# Patient Record
Sex: FEMALE | Race: WHITE | Employment: UNEMPLOYED | URBAN - METROPOLITAN AREA
[De-identification: names, ages, dates, MRNs, and addresses within clinical notes are randomized per-mention and may not be internally consistent; named-entity substitution may affect disease eponyms.]

---

## 2017-04-18 ENCOUNTER — OFFICE VISIT (OUTPATIENT)
Dept: INTERNAL MEDICINE CLINIC | Age: 37
End: 2017-04-18

## 2017-04-18 VITALS
TEMPERATURE: 98.3 F | OXYGEN SATURATION: 98 % | HEART RATE: 100 BPM | WEIGHT: 134.4 LBS | RESPIRATION RATE: 18 BRPM | DIASTOLIC BLOOD PRESSURE: 87 MMHG | HEIGHT: 67 IN | SYSTOLIC BLOOD PRESSURE: 139 MMHG | BODY MASS INDEX: 21.09 KG/M2

## 2017-04-18 DIAGNOSIS — Z00.00 WELL ADULT EXAM: Primary | ICD-10-CM

## 2017-04-18 DIAGNOSIS — R73.02 GLUCOSE INTOLERANCE (IMPAIRED GLUCOSE TOLERANCE): ICD-10-CM

## 2017-04-18 DIAGNOSIS — E55.9 VITAMIN D DEFICIENCY: ICD-10-CM

## 2017-04-18 DIAGNOSIS — D50.8 IRON DEFICIENCY ANEMIA SECONDARY TO INADEQUATE DIETARY IRON INTAKE: ICD-10-CM

## 2017-04-18 NOTE — MR AVS SNAPSHOT
Visit Information Date & Time Provider Department Dept. Phone Encounter #  
 4/18/2017  1:00 PM Christoph Morales MD [facility] [address] [id number] Upcoming Health Maintenance Date Due DTaP/Tdap/Td series (1 - Tdap) 12/29/2003 PAP AKA CERVICAL CYTOLOGY 12/29/2003 Allergies as of 4/18/2017  Review Complete On: 4/18/2017 By: Christoph Morales MD  
  
 Severity Noted Reaction Type Reactions Other Medication  09/19/2013   Intolerance Nausea and Vomiting Patient states that she is intolerant of general anesthesia and requires that an anti-nausea medication be given first.   
  
Current Immunizations  Never Reviewed Name Date Rho(D) Immune Globulin - IM 9/17/2015  1:03 PM, 9/23/2013  9:21 AM  
  
 Not reviewed this visit You Were Diagnosed With   
  
 Codes Comments Well adult exam    -  Primary ICD-10-CM: Z00.00 ICD-9-CM: V70.0 Iron deficiency anemia secondary to inadequate dietary iron intake     ICD-10-CM: D50.8 ICD-9-CM: 280.1 Glucose intolerance (impaired glucose tolerance)     ICD-10-CM: R73.02 
ICD-9-CM: 790.22 Vitamin D deficiency     ICD-10-CM: E55.9 ICD-9-CM: 268.9 Vitals BP Pulse Temp Resp Height(growth percentile) Weight(growth percentile) 139/87 (BP 1 Location: Left arm, BP Patient Position: Sitting) 100 98.3 °F (36.8 °C) (Oral) 18 5' 7\" (1.702 m) 134 lb 6.4 oz (61 kg) LMP SpO2 BMI OB Status Smoking Status 04/09/2017 (Exact Date) 98% 21.05 kg/m2 Having regular periods Never Smoker Vitals History BMI and BSA Data Body Mass Index Body Surface Area 21.05 kg/m 2 1.7 m 2 Preferred Pharmacy Pharmacy Name Phone [id number], [address] [place] [address] [phone] Your Updated Medication List  
  
   
This list is accurate as of: 4/18/17  2:10 PM.  Always use your most recent med list.  
  
  
  
  
 ibuprofen 600 mg tablet Commonly known as:  MOTRIN Take 1 Tab by mouth every six (6) hours as needed for Pain. IRON (FERROUS SULFATE) PO Take  by mouth daily. PRENATAL VITAMIN PO Take 1 Tab by mouth nightly. PROBIOTIC 4X 10-15 mg Tbec Generic drug:  B.infantis-B.ani-B.long-B.bifi Take  by mouth. We Performed the Following FERRITIN [04954 CPT(R)] HEMOGLOBIN A1C WITH EAG [79245 CPT(R)] SED RATE (ESR) [id number] CPT(R)] VITAMIN D, 25 HYDROXY [id number] CPT(R)] Patient Instructions Well Visit, Ages 25 to 48: Care Instructions Your Care Instructions Physical exams can help you stay healthy. Your doctor has checked your overall health and may have suggested ways to take good care of yourself. He or she also may have recommended tests. At home, you can help prevent illness with healthy eating, regular exercise, and other steps. Follow-up care is a key part of your treatment and safety. Be sure to make and go to all appointments, and call your doctor if you are having problems. It's also a good idea to know your test results and keep a list of the medicines you take. How can you care for yourself at home? · Reach and stay at a healthy weight. This will lower your risk for many problems, such as obesity, diabetes, heart disease, and high blood pressure. · Get at least 30 minutes of physical activity on most days of the week. Walking is a good choice. You also may want to do other activities, such as running, swimming, cycling, or playing tennis or team sports. Discuss any changes in your exercise program with your doctor. · Do not smoke or allow others to smoke around you. If you need help quitting, talk to your doctor about stop-smoking programs and medicines. These can increase your chances of quitting for good.  
· Talk to your doctor about whether you have any risk factors for sexually transmitted infections (STIs). Having one sex partner (who does not have STIs and does not have sex with anyone else) is a good way to avoid these infections. · Use birth control if you do not want to have children at this time. Talk with your doctor about the choices available and what might be best for you. · Protect your skin from too much sun. When you're outdoors from 10 a.m. to 4 p.m., stay in the shade or cover up with clothing and a hat with a wide brim. Wear sunglasses that block UV rays. Even when it's cloudy, put broad-spectrum sunscreen (SPF 30 or higher) on any exposed skin. · See a dentist one or two times a year for checkups and to have your teeth cleaned. · Wear a seat belt in the car. · Drink alcohol in moderation, if at all. That means no more than 2 drinks a day for men and 1 drink a day for women. Follow your doctor's advice about when to have certain tests. These tests can spot problems early. For everyone · Cholesterol. Have the fat (cholesterol) in your blood tested after age 21. Your doctor will tell you how often to have this done based on your age, family history, or other things that can increase your risk for heart disease. · Blood pressure. Have your blood pressure checked during a routine doctor visit. Your doctor will tell you how often to check your blood pressure based on your age, your blood pressure results, and other factors. · Vision. Talk with your doctor about how often to have a glaucoma test. 
· Diabetes. Ask your doctor whether you should have tests for diabetes. · Colon cancer. Have a test for colon cancer at age 48. You may have one of several tests. If you are younger than 48, you may need a test earlier if you have any risk factors. Risk factors include whether you already had a precancerous polyp removed from your colon or whether your parent, brother, sister, or child has had colon cancer. For women · Breast exam and mammogram. Talk to your doctor about when you should have a clinical breast exam and a mammogram. Medical experts differ on whether and how often women under 50 should have these tests. Your doctor can help you decide what is right for you. · Pap test and pelvic exam. Begin Pap tests at age 24. A Pap test is the best way to find cervical cancer. The test often is part of a pelvic exam. Ask how often to have this test. 
· Tests for sexually transmitted infections (STIs). Ask whether you should have tests for STIs. You may be at risk if you have sex with more than one person, especially if your partners do not wear condoms. For men · Tests for sexually transmitted infections (STIs). Ask whether you should have tests for STIs. You may be at risk if you have sex with more than one person, especially if you do not wear a condom. · Testicular cancer exam. Ask your doctor whether you should check your testicles regularly. · Prostate exam. Talk to your doctor about whether you should have a blood test (called a PSA test) for prostate cancer. Experts differ on whether and when men should have this test. Some experts suggest it if you are older than 39 and are -American or have a father or brother who got prostate cancer when he was younger than 72. When should you call for help? Watch closely for changes in your health, and be sure to contact your doctor if you have any problems or symptoms that concern you. Where can you learn more? Go to http://brea-amanda.info/. Enter P072 in the search box to learn more about \"Well Visit, Ages 25 to 48: Care Instructions. \" Current as of: July 19, 2016 Content Version: 11.2 © 6804-5701 Healthwise, Incorporated. Care instructions adapted under license by [employer] (which disclaims liability or warranty for this information).  If you have questions about a medical condition or this instruction, always ask your healthcare professional. [address] any warranty or liability for your use of this information. Introducing [facility] SERVICES! Dear Barbie: 
Thank you for requesting a [email or web address] account. Our records indicate that you already have an active [email or web address] account. You can access your account anytime at [email or web address] Did you know that you can access your hospital and ER discharge instructions at any time in [email or web address]? You can also review all of your test results from your hospital stay or ER visit. Additional Information If you have questions, please visit the Frequently Asked Questions section of the [email or web address] website at [email or web address] Remember, [email or web address] is NOT to be used for urgent needs. For medical emergencies, dial 911. Now available from your iPhone and Android! Please provide this summary of care documentation to your next provider. Your primary care clinician is listed as Jeana Collin. If you have any questions after today's visit, please call [phone].

## 2017-04-18 NOTE — PATIENT INSTRUCTIONS

## 2017-04-18 NOTE — PROGRESS NOTES
Aicha Hamm is a 31 y.o. female and presents with Establish Care  . Subjective:  Pt presents for NPV. She reports she was given recommendation to come here from some close friends. She gets anxious when her labs come back and will start looking things up on google. She reports anxiety does run in her family and brother with etoh. Pt reports she overall feels good. She reports she is very hypersensitive to her body symptoms. She does not like to take medications and prefers to not be on anything if she can help it. High blood pressure 136/80's  Home measurements    a1c 5.7-5.8  She reports she eat a lot of natural foods but are high in grains and carbs    thrombocytopenia  platelts were low  mvp elevated  Reviewed labs from [facility] with pt      Check ears  Wax  Balance issue in depth test  Motion sickness      Abdomen  Linea albea    Review of Systems  Constitutional: negative for fevers, chills, anorexia and weight loss  Eyes:   negative for visual disturbance and irritation  ENT:   negative for tinnitus,sore throat,nasal congestion,ear pains. hoarseness  Respiratory:  negative for cough, hemoptysis, dyspnea,wheezing  CV:   negative for chest pain, palpitations, lower extremity edema  GI:   negative for nausea, vomiting, diarrhea, abdominal pain,melena  Endo:               negative for polyuria,polydipsia,polyphagia,heat intolerance  Genitourinary: negative for frequency, dysuria and hematuria  Integument:  negative for rash and pruritus  Hematologic:  negative for easy bruising and gum/nose bleeding  Musculoskel: negative for myalgias, arthralgias, back pain, muscle weakness, joint pain  Neurological:  negative for headaches, dizziness, vertigo, memory problems and gait   Behavl/Psych: negative for feelings of anxiety, depression, mood changes    Past Medical History:   Diagnosis Date    Anemia 2015    taking iron    Complication of anesthesia     severe vomiting; requires antinausea medication first    Psychiatric problem 2015    anxiety, panic attack after last delivery    Rh negative status during pregnancy 9/22/2013    rhogham given 6/22/2015    Thyroid activity decreased 2015    tested multiple times, continued to improve through pregnancy, on no meds at this time     Past Surgical History:   Procedure Laterality Date    HX OTHER SURGICAL      wisdom    HX WISDOM TEETH EXTRACTION       Social History     Social History    Marital status:      Spouse name: N/A    Number of children: N/A    Years of education: N/A     Social History Main Topics    Smoking status: Never Smoker    Smokeless tobacco: Never Used    Alcohol use No    Drug use: No    Sexual activity: Yes     Partners: Male     Birth control/ protection: None     Other Topics Concern    None     Social History Narrative     Family History   Problem Relation Age of Onset    Cancer Father     Elevated Lipids Father     Hypertension Father     Cancer Maternal Grandmother     Hypertension Maternal Grandmother     Heart Disease Maternal Grandmother     Cancer Maternal Grandfather     Heart Disease Maternal Grandfather     Hypertension Maternal Grandfather     Cancer Paternal Grandmother     Diabetes Paternal Grandmother     Cancer Paternal Grandfather     Diabetes Paternal Grandfather     Hypertension Paternal Grandfather     Stroke Paternal Grandfather      Current Outpatient Prescriptions   Medication Sig Dispense Refill    IRON, FERROUS SULFATE, PO Take  by mouth daily.  B.infantis-B.ani-B.long-B.bifi (PROBIOTIC 4X) 10-15 mg TbEC Take  by mouth.  PRENATAL VITS W-CA,FE,FA,<1MG, (PRENATAL VITAMIN PO) Take 1 Tab by mouth nightly.  ibuprofen (MOTRIN) 600 mg tablet Take 1 Tab by mouth every six (6) hours as needed for Pain.  30 Tab 6     Allergies   Allergen Reactions    Other Medication Nausea and Vomiting     Patient states that she is intolerant of general anesthesia and requires that an anti-nausea medication be given first.        Objective:  Visit Vitals    /87 (BP 1 Location: Left arm, BP Patient Position: Sitting)    Pulse 100    Temp 98.3 °F (36.8 °C) (Oral)    Resp 18    Ht 5' 7\" (1.702 m)    Wt 134 lb 6.4 oz (61 kg)    LMP 2017 (Exact Date)    SpO2 98%    BMI 21.05 kg/m2     Physical Exam:   General appearance - alert, well appearing, and in no distress appears to be mildly anxious  Mental status - alert, oriented to person, place, and time  EYE-OTTO, EOMI, corneas normal, no foreign bodies, visual acuity normal both eyes, no periorbital cellulitis  ENT-ENT exam normal, no neck nodes or sinus tenderness  Nose - normal and patent, no erythema, discharge or polyps  Mouth - mucous membranes moist, pharynx normal without lesions  Neck - supple, no significant adenopathy   Chest - clear to auscultation, no wheezes, rales or rhonchi, symmetric air entry   Heart - normal rate, regular rhythm, normal S1, S2, no murmurs, rubs, clicks or gallops   Abdomen - soft, nontender, nondistended, no masses or organomegaly  Lymph- no adenopathy palpable  Ext-peripheral pulses normal, no pedal edema, no clubbing or cyanosis  Skin-Warm and dry.  no hyperpigmentation, vitiligo, or suspicious lesions  Neuro -alert, oriented, normal speech, no focal findings or movement disorder noted  Neck-normal C-spine, no tenderness, full ROM without pain      Results for orders placed or performed during the hospital encounter of 09/16/15   GYN RAPID GP B STREP   Result Value Ref Range    GrBStrep, External positive    ANTIBODY SCREEN   Result Value Ref Range    Antibody screen, External negative    RH IMMUNE GLOBULIN EVALUATION   Result Value Ref Range    ABO/Rh(D) A NEGATIVE     Fetal screen NEG     WEAK D NEG     Unit number [id number]     Blood component type RH IMMUNE GLOBULIN     Unit division 00     Status of unit TRANSFUSED      Prevention    Cardiovascular profile  Family hx  Exercisin times/week  Blood pressure:  Health healthy diet:  Diabetes:  Cholesterol:  Renal function:      Cancer risk profile  Mammogram  Lung  Colonoscopy small bout of IBS, GI Dr. Ángela Wesley   Skin nonhealing in 2 weeks, father with melanoma  Gyn abnormal bleeding/discharge/abd pain/pressure Dr. Horacio Benentt      Thyroid sx  3/2017  Us with mousalli in January    Osteopenia prevention  Calcium 1000mg/day yes  Vitamin D 800iu/day yes    Mental health scale: 7/10  Depression  Anxiety  Sleep # of hours:  Energy Level:        Immunizations  TDAP 2013  Pneumonia vaccine  Flu vaccine  Shingles vaccine  HPV        [place] was seen today for establish care. Diagnoses and all orders for this visit:    Well adult exam  Appears in good health  She self reports increased body sensitivity and gets concerned with labs  I discussed that she likely has anxiety and discussed the positive and neg aspects of this  She may do well with some CBT. She can try feeling good book by Dr. Ronald Dawn  Will do sed rate as baseline  -     SED RATE (ESR)    Iron deficiency anemia secondary to inadequate dietary iron intake  -     FERRITIN    Glucose intolerance (impaired glucose tolerance)  -     HEMOGLOBIN A1C WITH EAG    Vitamin D deficiency  -     VITAMIN D, 25 HYDROXY      Check labs follow up in 1 year or prn  She would appreciate a call with her labs if possible. This note will not be viewable in [address]

## 2017-04-19 LAB
25(OH)D3+25(OH)D2 SERPL-MCNC: 32.2 NG/ML (ref 30–100)
ERYTHROCYTE [SEDIMENTATION RATE] IN BLOOD BY WESTERGREN METHOD: 4 MM/HR (ref 0–32)
EST. AVERAGE GLUCOSE BLD GHB EST-MCNC: 120 MG/DL
FERRITIN SERPL-MCNC: 27 NG/ML (ref 15–150)
HBA1C MFR BLD: 5.8 % (ref 4.8–5.6)

## 2017-07-11 LAB
HBSAG, EXTERNAL: NEGATIVE
HIV, EXTERNAL: NON REACTIVE
RUBELLA, EXTERNAL: NORMAL
T. PALLIDUM, EXTERNAL: NEGATIVE
TYPE, ABO & RH, EXTERNAL: NORMAL

## 2017-09-24 ENCOUNTER — APPOINTMENT (OUTPATIENT)
Dept: ULTRASOUND IMAGING | Age: 37
End: 2017-09-24
Attending: EMERGENCY MEDICINE
Payer: COMMERCIAL

## 2017-09-24 ENCOUNTER — HOSPITAL ENCOUNTER (EMERGENCY)
Age: 37
Discharge: HOME | End: 2017-09-24
Attending: EMERGENCY MEDICINE
Payer: COMMERCIAL

## 2017-09-24 VITALS
HEART RATE: 99 BPM | BODY MASS INDEX: 20.36 KG/M2 | TEMPERATURE: 98.8 F | DIASTOLIC BLOOD PRESSURE: 76 MMHG | OXYGEN SATURATION: 99 % | RESPIRATION RATE: 16 BRPM | WEIGHT: 130 LBS | SYSTOLIC BLOOD PRESSURE: 118 MMHG

## 2017-09-24 DIAGNOSIS — R10.9 FLANK PAIN, ACUTE: Primary | ICD-10-CM

## 2017-09-24 LAB
ALBUMIN SERPL-MCNC: 3.3 G/DL (ref 3.5–5)
ALBUMIN/GLOB SERPL: 0.8 {RATIO} (ref 1.1–2.2)
ALP SERPL-CCNC: 59 U/L (ref 45–117)
ALT SERPL-CCNC: 16 U/L (ref 12–78)
ANION GAP SERPL CALC-SCNC: 9 MMOL/L (ref 5–15)
APPEARANCE UR: ABNORMAL
AST SERPL-CCNC: 13 U/L (ref 15–37)
BACTERIA URNS QL MICRO: NEGATIVE /HPF
BASOPHILS # BLD: 0 K/UL (ref 0–0.1)
BASOPHILS NFR BLD: 0 % (ref 0–1)
BILIRUB SERPL-MCNC: 0.2 MG/DL (ref 0.2–1)
BILIRUB UR QL: NEGATIVE
BUN SERPL-MCNC: 14 MG/DL (ref 6–20)
BUN/CREAT SERPL: 21 (ref 12–20)
CALCIUM SERPL-MCNC: 8.3 MG/DL (ref 8.5–10.1)
CAOX CRY URNS QL MICRO: ABNORMAL
CHLORIDE SERPL-SCNC: 103 MMOL/L (ref 97–108)
CO2 SERPL-SCNC: 24 MMOL/L (ref 21–32)
COLOR UR: ABNORMAL
CREAT SERPL-MCNC: 0.66 MG/DL (ref 0.55–1.02)
EOSINOPHIL # BLD: 0.1 K/UL (ref 0–0.4)
EOSINOPHIL NFR BLD: 1 % (ref 0–7)
EPITH CASTS URNS QL MICRO: ABNORMAL /LPF
ERYTHROCYTE [DISTWIDTH] IN BLOOD BY AUTOMATED COUNT: 14.2 % (ref 11.5–14.5)
GLOBULIN SER CALC-MCNC: 4 G/DL (ref 2–4)
GLUCOSE SERPL-MCNC: 91 MG/DL (ref 65–100)
GLUCOSE UR STRIP.AUTO-MCNC: NEGATIVE MG/DL
HCT VFR BLD AUTO: 36.2 % (ref 35–47)
HGB BLD-MCNC: 11.9 G/DL (ref 11.5–16)
HGB UR QL STRIP: ABNORMAL
KETONES UR QL STRIP.AUTO: 15 MG/DL
LEUKOCYTE ESTERASE UR QL STRIP.AUTO: ABNORMAL
LYMPHOCYTES # BLD: 0.6 K/UL (ref 0.8–3.5)
LYMPHOCYTES NFR BLD: 7 % (ref 12–49)
MCH RBC QN AUTO: 29.2 PG (ref 26–34)
MCHC RBC AUTO-ENTMCNC: 32.9 G/DL (ref 30–36.5)
MCV RBC AUTO: 88.9 FL (ref 80–99)
MONOCYTES # BLD: 0.5 K/UL (ref 0–1)
MONOCYTES NFR BLD: 6 % (ref 5–13)
NEUTS SEG # BLD: 7 K/UL (ref 1.8–8)
NEUTS SEG NFR BLD: 86 % (ref 32–75)
NITRITE UR QL STRIP.AUTO: NEGATIVE
PH UR STRIP: 6 [PH] (ref 5–8)
PLATELET # BLD AUTO: 133 K/UL (ref 150–400)
PLATELET COMMENTS,PCOM: ABNORMAL
POTASSIUM SERPL-SCNC: 3.7 MMOL/L (ref 3.5–5.1)
PROT SERPL-MCNC: 7.3 G/DL (ref 6.4–8.2)
PROT UR STRIP-MCNC: NEGATIVE MG/DL
RBC # BLD AUTO: 4.07 M/UL (ref 3.8–5.2)
RBC #/AREA URNS HPF: ABNORMAL /HPF (ref 0–5)
RBC MORPH BLD: ABNORMAL
SODIUM SERPL-SCNC: 136 MMOL/L (ref 136–145)
SP GR UR REFRACTOMETRY: 1.03 (ref 1–1.03)
UR CULT HOLD, URHOLD: NORMAL
UROBILINOGEN UR QL STRIP.AUTO: 1 EU/DL (ref 0.2–1)
WBC # BLD AUTO: 8.2 K/UL (ref 3.6–11)
WBC URNS QL MICRO: ABNORMAL /HPF (ref 0–4)

## 2017-09-24 PROCEDURE — 81001 URINALYSIS AUTO W/SCOPE: CPT | Performed by: EMERGENCY MEDICINE

## 2017-09-24 PROCEDURE — 74011250636 HC RX REV CODE- 250/636: Performed by: EMERGENCY MEDICINE

## 2017-09-24 PROCEDURE — 96360 HYDRATION IV INFUSION INIT: CPT

## 2017-09-24 PROCEDURE — 76770 US EXAM ABDO BACK WALL COMP: CPT

## 2017-09-24 PROCEDURE — 85025 COMPLETE CBC W/AUTO DIFF WBC: CPT | Performed by: EMERGENCY MEDICINE

## 2017-09-24 PROCEDURE — 87086 URINE CULTURE/COLONY COUNT: CPT | Performed by: EMERGENCY MEDICINE

## 2017-09-24 PROCEDURE — 99285 EMERGENCY DEPT VISIT HI MDM: CPT

## 2017-09-24 PROCEDURE — 80053 COMPREHEN METABOLIC PANEL: CPT | Performed by: EMERGENCY MEDICINE

## 2017-09-24 PROCEDURE — 36415 COLL VENOUS BLD VENIPUNCTURE: CPT | Performed by: EMERGENCY MEDICINE

## 2017-09-24 PROCEDURE — 74011250637 HC RX REV CODE- 250/637: Performed by: EMERGENCY MEDICINE

## 2017-09-24 PROCEDURE — 76815 OB US LIMITED FETUS(S): CPT

## 2017-09-24 RX ORDER — SODIUM CHLORIDE 9 MG/ML
1000 INJECTION, SOLUTION INTRAVENOUS ONCE
Status: COMPLETED | OUTPATIENT
Start: 2017-09-24 | End: 2017-09-24

## 2017-09-24 RX ORDER — ACETAMINOPHEN 325 MG/1
975 TABLET ORAL
Status: COMPLETED | OUTPATIENT
Start: 2017-09-24 | End: 2017-09-24

## 2017-09-24 RX ORDER — OXYCODONE HYDROCHLORIDE 5 MG/1
5 TABLET ORAL
Qty: 12 TAB | Refills: 0 | Status: SHIPPED | OUTPATIENT
Start: 2017-09-24 | End: 2018-02-21

## 2017-09-24 RX ORDER — OXYCODONE HYDROCHLORIDE 5 MG/1
5 TABLET ORAL
Status: DISCONTINUED | OUTPATIENT
Start: 2017-09-24 | End: 2017-09-24 | Stop reason: HOSPADM

## 2017-09-24 RX ORDER — PROMETHAZINE HYDROCHLORIDE 25 MG/1
25 TABLET ORAL
Qty: 9 TAB | Refills: 0 | Status: SHIPPED | OUTPATIENT
Start: 2017-09-24 | End: 2018-02-21

## 2017-09-24 RX ADMIN — SODIUM CHLORIDE 1000 ML: 900 INJECTION, SOLUTION INTRAVENOUS at 17:36

## 2017-09-24 RX ADMIN — ACETAMINOPHEN 975 MG: 325 TABLET, FILM COATED ORAL at 17:36

## 2017-09-24 NOTE — ED NOTES
Urine strainer provided to pt and pt instructed on proper use and pt verbalized understanding. Discharge instructions given to patient by MD and nurse. Pt has been given counseling on medication use and verbalizes understanding. IV d/c. Pt ambulated off of unit in no signs of distress.

## 2017-09-24 NOTE — ED PROVIDER NOTES
HPI Comments: 45-year-old female who reports she is 19 weeks pregnant but is 24 weeks pregnant by date (LMP 4/9/17)  presents to the emergency room for left back and flank pain. Onset was several hours prior to arrival. Pain has been constant. Patient is sharp pain in the back and the flank. Associated nausea. No vomiting. No fever or chills. Patient is on day 4 of Macrobid for a UTI. She noticed blood in her urine several days ago but no further. she has not had fever or chills. No dysuria. No chest pain, shortness of breath difficulty breathing. No dizziness or lightheadedness. No shoulder pain. She has not taken anything for the pain. No alleviating factors. Social hx  Nonsmoker  No alcohol. Patient is a 31 y.o. female presenting with flank pain. The history is provided by the patient. Flank Pain    Associated symptoms include abdominal pain. Pertinent negatives include no chest pain, no fever, no numbness, no headaches and no dysuria.         Past Medical History:   Diagnosis Date    Anemia 2015    taking iron    Complication of anesthesia     severe vomiting; requires antinausea medication first    Psychiatric problem 2015    anxiety, panic attack after last delivery    Rh negative status during pregnancy 9/22/2013    rhogham given 6/22/2015    Thyroid activity decreased 2015    tested multiple times, continued to improve through pregnancy, on no meds at this time       Past Surgical History:   Procedure Laterality Date    HX OTHER SURGICAL      wisdom    HX WISDOM TEETH EXTRACTION           Family History:   Problem Relation Age of Onset    Cancer Father      melanoma and prostatate    Elevated Lipids Father     Hypertension Father     Diabetes Father     Cancer Maternal Grandmother      uterine    Hypertension Maternal Grandmother     Heart Disease Maternal Grandmother     Cancer Maternal Grandfather      brain cancer    Heart Disease Maternal Grandfather     Hypertension Maternal Grandfather     Cancer Paternal Grandmother      breast cancer    Diabetes Paternal Grandmother     Cancer Paternal Grandfather      throid    Diabetes Paternal Grandfather     Hypertension Paternal Grandfather     Stroke Paternal Grandfather     Anxiety Mother      diabetes borderline, POTS, fibromyalgia, IBS    Hypertension Brother      etohsim       Social History     Social History    Marital status:      Spouse name: N/A    Number of children: N/A    Years of education: N/A     Occupational History    Not on file. Social History Main Topics    Smoking status: Never Smoker    Smokeless tobacco: Never Used    Alcohol use No    Drug use: No    Sexual activity: Yes     Partners: Male     Birth control/ protection: None     Other Topics Concern    Not on file     Social History Narrative    Mother of 6 children        21 months to 8year old    All healthy         ALLERGIES: Other medication    Review of Systems   Constitutional: Negative for chills and fever. HENT: Negative for congestion, rhinorrhea and sore throat. Respiratory: Negative for cough and shortness of breath. Cardiovascular: Negative for chest pain and palpitations. Gastrointestinal: Positive for abdominal pain and nausea. Negative for blood in stool, diarrhea and vomiting. Genitourinary: Positive for flank pain. Negative for dysuria, frequency and urgency. Musculoskeletal: Positive for back pain. Negative for arthralgias, myalgias, neck pain and neck stiffness. Skin: Negative for rash and wound. Neurological: Negative for dizziness, numbness and headaches. All other systems reviewed and are negative. Vitals:    09/24/17 1632   BP: 117/52   Pulse: (!) 105   Resp: 18   Temp: 98.7 °F (37.1 °C)            Physical Exam   Constitutional: She is oriented to person, place, and time. She appears well-developed and well-nourished. No distress. HENT:   Head: Normocephalic and atraumatic.    Right Ear: External ear normal.   Left Ear: External ear normal.   Eyes: Conjunctivae and EOM are normal. Pupils are equal, round, and reactive to light. Neck: Normal range of motion. Neck supple. Cardiovascular: Normal rate, regular rhythm and normal heart sounds. Pulmonary/Chest: Effort normal and breath sounds normal. No respiratory distress. She has no wheezes. Abdominal: Soft. Normal appearance and bowel sounds are normal. She exhibits no shifting dullness, no distension, no pulsatile liver, no abdominal bruit, no pulsatile midline mass and no mass. There is no hepatosplenomegaly, splenomegaly or hepatomegaly. There is tenderness. There is no rigidity, no rebound, no guarding, no CVA tenderness, no tenderness at McBurney's point and negative Chambers's sign. Abdomen gravid. Tender left flank and back   Musculoskeletal: Normal range of motion. She exhibits no edema or tenderness. Neurological: She is alert and oriented to person, place, and time. She has normal reflexes. No cranial nerve deficit. Coordination normal.   Skin: Skin is warm and dry. No rash noted. No erythema. Psychiatric: She has a normal mood and affect. Her behavior is normal. Judgment and thought content normal.   Nursing note and vitals reviewed. MDM  Number of Diagnoses or Management Options  Flank pain, acute:   Diagnosis management comments: 77-year-old female presents to the emergency room for left back and flank pain. She is afebrile. Lungs are clear bilaterally abdomen soft patient is tender in the left flank. Plan: As patient is pregnant will get a renal ultrasound, check labs, urinalysis, intravenous fluids, tylenol    7:22 PM  No hydronephrosis on ultrasound. UA not clean catch. Pt afebrile. No elevated wbc. Pt did get improvement in pain with tylenol. I discussed case including labs and ultrasound with Dr. Patsy Khan, ob/gyn hospitalist. Will treat patient as stone. She reviewed urine culture of patient.  Urine culture was negative. Pt can have oxycodone and limit use and alternate with tylenol. Pt to followup with Dr. Gloria Gabriel. This has been discussed with patient and family. They are in agreement of plan. Standard narcotic and sedating medication warnings given  Patient's results have been reviewed with them. Patient and/or family have verbally conveyed their understanding and agreement of the patient's signs, symptoms, diagnosis, treatment and prognosis and additionally agree to follow up as recommended or return to the Emergency Room should their condition change prior to follow-up. Discharge instructions have also been provided to the patient with some educational information regarding their diagnosis as well a list of reasons why they would want to return to the ER prior to their follow-up appointment should their condition change. Amount and/or Complexity of Data Reviewed  Discuss the patient with other providers: yes (ER attendingCan)    Patient Progress  Patient progress: stable    ED Course       Procedures             Pt case including HPI, PE, and all available lab and radiology results has been discussed with attending physician. Opportunity to evaluate patient has been provided to ER attending. Discharge and prescription plan has been agreed upon.

## 2017-09-24 NOTE — DISCHARGE INSTRUCTIONS
Kidney Stone: Care Instructions  Your Care Instructions    Kidney stones are formed when salts, minerals, and other substances normally found in the urine clump together. They can be as small as grains of sand or, rarely, as large as golf balls. While the stone is traveling through the ureter, which is the tube that carries urine from the kidney to the bladder, you will probably feel pain. The pain may be mild or very severe. You may also have some blood in your urine. As soon as the stone reaches the bladder, any intense pain should go away. If a stone is too large to pass on its own, you may need a medical procedure to help you pass the stone. The doctor has checked you carefully, but problems can develop later. If you notice any problems or new symptoms, get medical treatment right away. Follow-up care is a key part of your treatment and safety. Be sure to make and go to all appointments, and call your doctor if you are having problems. It's also a good idea to know your test results and keep a list of the medicines you take. How can you care for yourself at home? · Drink plenty of fluids, enough so that your urine is light yellow or clear like water. If you have kidney, heart, or liver disease and have to limit fluids, talk with your doctor before you increase the amount of fluids you drink. · Take pain medicines exactly as directed. Call your doctor if you think you are having a problem with your medicine. ¨ If the doctor gave you a prescription medicine for pain, take it as prescribed. ¨ If you are not taking a prescription pain medicine, ask your doctor if you can take an over-the-counter medicine. Read and follow all instructions on the label. · Your doctor may ask you to strain your urine so that you can collect your kidney stone when it passes. You can use a kitchen strainer or a tea strainer to catch the stone. Store it in a plastic bag until you see your doctor again.   Preventing future kidney stones  Some changes in your diet may help prevent kidney stones. Depending on the cause of your stones, your doctor may recommend that you:  · Drink plenty of fluids, enough so that your urine is light yellow or clear like water. If you have kidney, heart, or liver disease and have to limit fluids, talk with your doctor before you increase the amount of fluids you drink. · Limit coffee, tea, and alcohol. Also avoid grapefruit juice. · Do not take more than the recommended daily dose of vitamins C and D.  · Avoid antacids such as Gaviscon, Maalox, Mylanta, or Tums. · Limit the amount of salt (sodium) in your diet. · Eat a balanced diet that is not too high in protein. · Limit foods that are high in a substance called oxalate, which can cause kidney stones. These foods include dark green vegetables, rhubarb, chocolate, wheat bran, nuts, cranberries, and beans. When should you call for help? Call your doctor now or seek immediate medical care if:  · You cannot keep down fluids. · Your pain gets worse. · You have a fever or chills. · You have new or worse pain in your back just below your rib cage (the flank area). · You have new or more blood in your urine. Watch closely for changes in your health, and be sure to contact your doctor if:  · You do not get better as expected. Where can you learn more? Go to http://brea-amanda.info/. Enter R168 in the search box to learn more about \"Kidney Stone: Care Instructions. \"  Current as of: April 3, 2017  Content Version: 11.3  © 9743-0338 [employer]. Care instructions adapted under license by [employer] (which disclaims liability or warranty for this information). If you have questions about a medical condition or this instruction, always ask your healthcare professional. [address] any warranty or liability for your use of this information.        Flank Pain: Care Instructions  Your Care Instructions  Flank pain is pain on the side of the back just below the rib cage and above the waist. It can be on one or both sides. Flank pain has many possible causes, including a kidney stone, a urinary tract infection, or back strain. Flank pain may get better on its own. But don't ignore new symptoms, such as fever, nausea and vomiting, urination problems, pain that gets worse, and dizziness. These may be signs of a more serious problem. You may have to have tests or other treatment. Follow-up care is a key part of your treatment and safety. Be sure to make and go to all appointments, and call your doctor if you are having problems. It's also a good idea to know your test results and keep a list of the medicines you take. How can you care for yourself at home? · Rest until you feel better. · Take pain medicines exactly as directed. ¨ If the doctor gave you a prescription medicine for pain, take it as prescribed. ¨ If you are not taking a prescription pain medicine, ask your doctor if you can take an over-the-counter pain medicine, such as acetaminophen (Tylenol), ibuprofen (Advil, Motrin), or naproxen (Aleve). Read and follow all instructions on the label. · If your doctor prescribed antibiotics, take them as directed. Do not stop taking them just because you feel better. You need to take the full course of antibiotics. · To apply heat, put a warm water bottle, a heating pad set on low, or a warm cloth on the painful area. Do not go to sleep with a heating pad on your skin. · To prevent dehydration, drink plenty of fluids, enough so that your urine is light yellow or clear like water. Choose water and other caffeine-free clear liquids until you feel better. If you have kidney, heart, or liver disease and have to limit fluids, talk with your doctor before you increase the amount of fluids you drink. When should you call for help?   Call your doctor now or seek immediate medical care if:  · Your flank pain gets worse. · You have new symptoms, such as fever, nausea, or vomiting. · You have symptoms of a urinary problem. For example:  ¨ You have blood or pus in your urine. ¨ You have chills or body aches. ¨ It hurts to urinate. ¨ You have groin or belly pain. Watch closely for changes in your health, and be sure to contact your doctor if you do not get better as expected. Where can you learn more? Go to http://brea-amanda.info/. Enter S191 in the search box to learn more about \"Flank Pain: Care Instructions. \"  Current as of: March 20, 2017  Content Version: 11.3  © 0693-4464 [employer]. Care instructions adapted under license by [employer] (which disclaims liability or warranty for this information). If you have questions about a medical condition or this instruction, always ask your healthcare professional. [address] any warranty or liability for your use of this information.

## 2017-09-24 NOTE — ED TRIAGE NOTES
Patient has been treated with antibiotic for 4 days for UTI. Today she is having left flank pain that radiates to groin. She is 20 week pregnant. Difficulty urinating.

## 2017-09-26 LAB
BACTERIA SPEC CULT: NORMAL
CC UR VC: NORMAL
SERVICE CMNT-IMP: NORMAL

## 2017-11-21 LAB — ANTIBODY SCREEN, EXTERNAL: NEGATIVE

## 2018-01-22 LAB — GRBS, EXTERNAL: NEGATIVE

## 2018-02-21 ENCOUNTER — HOSPITAL ENCOUNTER (OUTPATIENT)
Dept: OTHER | Age: 38
Discharge: HOME | End: 2018-02-21
Payer: COMMERCIAL

## 2018-02-21 VITALS — HEIGHT: 67 IN | BODY MASS INDEX: 25.11 KG/M2 | WEIGHT: 160 LBS

## 2018-02-21 LAB
ERYTHROCYTE [DISTWIDTH] IN BLOOD BY AUTOMATED COUNT: 14.8 % (ref 11.5–14.5)
HCT VFR BLD AUTO: 35 % (ref 35–47)
HGB BLD-MCNC: 11.6 G/DL (ref 11.5–16)
MCH RBC QN AUTO: 31.3 PG (ref 26–34)
MCHC RBC AUTO-ENTMCNC: 33.1 G/DL (ref 30–36.5)
MCV RBC AUTO: 94.3 FL (ref 80–99)
NRBC # BLD: 0 K/UL (ref 0–0.01)
NRBC BLD-RTO: 0 PER 100 WBC
PLATELET # BLD AUTO: 101 K/UL (ref 150–400)
RBC # BLD AUTO: 3.71 M/UL (ref 3.8–5.2)
WBC # BLD AUTO: 6.2 K/UL (ref 3.6–11)

## 2018-02-21 PROCEDURE — 85027 COMPLETE CBC AUTOMATED: CPT | Performed by: OBSTETRICS & GYNECOLOGY

## 2018-02-21 PROCEDURE — 36415 COLL VENOUS BLD VENIPUNCTURE: CPT | Performed by: OBSTETRICS & GYNECOLOGY

## 2018-02-21 PROCEDURE — 86644 CMV ANTIBODY: CPT | Performed by: OBSTETRICS & GYNECOLOGY

## 2018-02-21 PROCEDURE — 86900 BLOOD TYPING SEROLOGIC ABO: CPT | Performed by: OBSTETRICS & GYNECOLOGY

## 2018-02-21 PROCEDURE — 86922 COMPATIBILITY TEST ANTIGLOB: CPT | Performed by: OBSTETRICS & GYNECOLOGY

## 2018-02-21 PROCEDURE — 86921 COMPATIBILITY TEST INCUBATE: CPT | Performed by: OBSTETRICS & GYNECOLOGY

## 2018-02-21 PROCEDURE — 86870 RBC ANTIBODY IDENTIFICATION: CPT | Performed by: OBSTETRICS & GYNECOLOGY

## 2018-02-21 RX ORDER — SODIUM CHLORIDE 0.9 % (FLUSH) 0.9 %
5-10 SYRINGE (ML) INJECTION AS NEEDED
Status: CANCELLED | OUTPATIENT
Start: 2018-02-21

## 2018-02-21 RX ORDER — NALBUPHINE HYDROCHLORIDE 10 MG/ML
10 INJECTION, SOLUTION INTRAMUSCULAR; INTRAVENOUS; SUBCUTANEOUS
Status: CANCELLED | OUTPATIENT
Start: 2018-02-21

## 2018-02-21 RX ORDER — SODIUM CHLORIDE, SODIUM LACTATE, POTASSIUM CHLORIDE, CALCIUM CHLORIDE 600; 310; 30; 20 MG/100ML; MG/100ML; MG/100ML; MG/100ML
125 INJECTION, SOLUTION INTRAVENOUS CONTINUOUS
Status: CANCELLED | OUTPATIENT
Start: 2018-02-21

## 2018-02-21 RX ORDER — NALOXONE HYDROCHLORIDE 0.4 MG/ML
0.4 INJECTION, SOLUTION INTRAMUSCULAR; INTRAVENOUS; SUBCUTANEOUS AS NEEDED
Status: CANCELLED | OUTPATIENT
Start: 2018-02-21

## 2018-02-21 RX ORDER — SODIUM CHLORIDE 0.9 % (FLUSH) 0.9 %
5-10 SYRINGE (ML) INJECTION EVERY 8 HOURS
Status: CANCELLED | OUTPATIENT
Start: 2018-02-21

## 2018-02-21 RX ORDER — ONDANSETRON 2 MG/ML
4 INJECTION INTRAMUSCULAR; INTRAVENOUS
Status: CANCELLED | OUTPATIENT
Start: 2018-02-21

## 2018-02-21 NOTE — PROGRESS NOTES
Patient here for Pre-Admission Testing (PAT) for scheduled induction. PAT packet reviewed with the patient. Labs drawn and sent. Education provided that patient may have clear liquids after midnight and to arrive at 0600 on 2/22/18. Patient verbalizes understanding and sent home with PAT packet for further review. Patient instructed to take no medication(s) on the morning of her scheduled procedure.

## 2018-02-22 ENCOUNTER — ANESTHESIA (OUTPATIENT)
Dept: LABOR AND DELIVERY | Age: 38
End: 2018-02-22
Payer: COMMERCIAL

## 2018-02-22 ENCOUNTER — HOSPITAL ENCOUNTER (INPATIENT)
Age: 38
LOS: 1 days | Discharge: HOME | End: 2018-02-23
Attending: OBSTETRICS & GYNECOLOGY | Admitting: OBSTETRICS & GYNECOLOGY
Payer: COMMERCIAL

## 2018-02-22 ENCOUNTER — ANESTHESIA EVENT (OUTPATIENT)
Dept: LABOR AND DELIVERY | Age: 38
End: 2018-02-22
Payer: COMMERCIAL

## 2018-02-22 LAB
ERYTHROCYTE [DISTWIDTH] IN BLOOD BY AUTOMATED COUNT: 14.6 % (ref 11.5–14.5)
HCT VFR BLD AUTO: 34.6 % (ref 35–47)
HGB BLD-MCNC: 11.8 G/DL (ref 11.5–16)
MCH RBC QN AUTO: 31.4 PG (ref 26–34)
MCHC RBC AUTO-ENTMCNC: 34.1 G/DL (ref 30–36.5)
MCV RBC AUTO: 92 FL (ref 80–99)
NRBC # BLD: 0 K/UL (ref 0–0.01)
NRBC BLD-RTO: 0 PER 100 WBC
PLATELET # BLD AUTO: 103 K/UL (ref 150–400)
PMV BLD AUTO: 12.5 FL (ref 8.9–12.9)
RBC # BLD AUTO: 3.76 M/UL (ref 3.8–5.2)
WBC # BLD AUTO: 6.8 K/UL (ref 3.6–11)

## 2018-02-22 PROCEDURE — 10907ZC DRAINAGE OF AMNIOTIC FLUID, THERAPEUTIC FROM PRODUCTS OF CONCEPTION, VIA NATURAL OR ARTIFICIAL OPENING: ICD-10-PCS | Performed by: OBSTETRICS & GYNECOLOGY

## 2018-02-22 PROCEDURE — 75410000002 HC LABOR FEE PER 1 HR

## 2018-02-22 PROCEDURE — 75410000000 HC DELIVERY VAGINAL/SINGLE

## 2018-02-22 PROCEDURE — 00HU33Z INSERTION OF INFUSION DEVICE INTO SPINAL CANAL, PERCUTANEOUS APPROACH: ICD-10-PCS | Performed by: ANESTHESIOLOGY

## 2018-02-22 PROCEDURE — 74011250636 HC RX REV CODE- 250/636: Performed by: ANESTHESIOLOGY

## 2018-02-22 PROCEDURE — 3E033VJ INTRODUCTION OF OTHER HORMONE INTO PERIPHERAL VEIN, PERCUTANEOUS APPROACH: ICD-10-PCS | Performed by: OBSTETRICS & GYNECOLOGY

## 2018-02-22 PROCEDURE — 74011000250 HC RX REV CODE- 250

## 2018-02-22 PROCEDURE — 76060000078 HC EPIDURAL ANESTHESIA

## 2018-02-22 PROCEDURE — 65410000002 HC RM PRIVATE OB

## 2018-02-22 PROCEDURE — 75410000003 HC RECOV DEL/VAG/CSECN EA 0.5 HR

## 2018-02-22 PROCEDURE — 36415 COLL VENOUS BLD VENIPUNCTURE: CPT | Performed by: OBSTETRICS & GYNECOLOGY

## 2018-02-22 PROCEDURE — 85027 COMPLETE CBC AUTOMATED: CPT | Performed by: OBSTETRICS & GYNECOLOGY

## 2018-02-22 PROCEDURE — 77030014125 HC TY EPDRL BBMI -B: Performed by: ANESTHESIOLOGY

## 2018-02-22 PROCEDURE — 74011250636 HC RX REV CODE- 250/636

## 2018-02-22 PROCEDURE — 74011250636 HC RX REV CODE- 250/636: Performed by: OBSTETRICS & GYNECOLOGY

## 2018-02-22 RX ORDER — LIDOCAINE HYDROCHLORIDE AND EPINEPHRINE 15; 5 MG/ML; UG/ML
INJECTION, SOLUTION EPIDURAL AS NEEDED
Status: DISCONTINUED | OUTPATIENT
Start: 2018-02-22 | End: 2018-02-22 | Stop reason: HOSPADM

## 2018-02-22 RX ORDER — FENTANYL CITRATE 50 UG/ML
100 INJECTION, SOLUTION INTRAMUSCULAR; INTRAVENOUS ONCE
Status: ACTIVE | OUTPATIENT
Start: 2018-02-22 | End: 2018-02-22

## 2018-02-22 RX ORDER — IBUPROFEN 400 MG/1
800 TABLET ORAL EVERY 8 HOURS
Status: DISCONTINUED | OUTPATIENT
Start: 2018-02-22 | End: 2018-02-24 | Stop reason: HOSPADM

## 2018-02-22 RX ORDER — NALOXONE HYDROCHLORIDE 0.4 MG/ML
0.4 INJECTION, SOLUTION INTRAMUSCULAR; INTRAVENOUS; SUBCUTANEOUS AS NEEDED
Status: DISCONTINUED | OUTPATIENT
Start: 2018-02-22 | End: 2018-02-22 | Stop reason: HOSPADM

## 2018-02-22 RX ORDER — ONDANSETRON 2 MG/ML
4 INJECTION INTRAMUSCULAR; INTRAVENOUS
Status: DISCONTINUED | OUTPATIENT
Start: 2018-02-22 | End: 2018-02-22 | Stop reason: HOSPADM

## 2018-02-22 RX ORDER — ONDANSETRON 4 MG/1
4 TABLET, ORALLY DISINTEGRATING ORAL
Status: DISCONTINUED | OUTPATIENT
Start: 2018-02-22 | End: 2018-02-24 | Stop reason: HOSPADM

## 2018-02-22 RX ORDER — SIMETHICONE 80 MG
80 TABLET,CHEWABLE ORAL
Status: DISCONTINUED | OUTPATIENT
Start: 2018-02-22 | End: 2018-02-24 | Stop reason: HOSPADM

## 2018-02-22 RX ORDER — SODIUM CHLORIDE 0.9 % (FLUSH) 0.9 %
5-10 SYRINGE (ML) INJECTION AS NEEDED
Status: DISCONTINUED | OUTPATIENT
Start: 2018-02-22 | End: 2018-02-24 | Stop reason: HOSPADM

## 2018-02-22 RX ORDER — DIPHENHYDRAMINE HCL 25 MG
25 CAPSULE ORAL
Status: DISCONTINUED | OUTPATIENT
Start: 2018-02-22 | End: 2018-02-24 | Stop reason: HOSPADM

## 2018-02-22 RX ORDER — FENTANYL/BUPIVACAINE/NS/PF 2-1250MCG
1-16 PREFILLED PUMP RESERVOIR EPIDURAL CONTINUOUS
Status: DISCONTINUED | OUTPATIENT
Start: 2018-02-22 | End: 2018-02-24 | Stop reason: HOSPADM

## 2018-02-22 RX ORDER — BUPIVACAINE HYDROCHLORIDE 5 MG/ML
30 INJECTION, SOLUTION EPIDURAL; INTRACAUDAL ONCE
Status: ACTIVE | OUTPATIENT
Start: 2018-02-22 | End: 2018-02-22

## 2018-02-22 RX ORDER — ACETAMINOPHEN 325 MG/1
650 TABLET ORAL
Status: DISCONTINUED | OUTPATIENT
Start: 2018-02-22 | End: 2018-02-24 | Stop reason: HOSPADM

## 2018-02-22 RX ORDER — OXYTOCIN IN 5 % DEXTROSE 30/500 ML
1-25 PLASTIC BAG, INJECTION (ML) INTRAVENOUS
Status: DISCONTINUED | OUTPATIENT
Start: 2018-02-22 | End: 2018-02-22 | Stop reason: HOSPADM

## 2018-02-22 RX ORDER — NALOXONE HYDROCHLORIDE 0.4 MG/ML
0.4 INJECTION, SOLUTION INTRAMUSCULAR; INTRAVENOUS; SUBCUTANEOUS AS NEEDED
Status: DISCONTINUED | OUTPATIENT
Start: 2018-02-22 | End: 2018-02-24 | Stop reason: HOSPADM

## 2018-02-22 RX ORDER — SODIUM CHLORIDE, SODIUM LACTATE, POTASSIUM CHLORIDE, CALCIUM CHLORIDE 600; 310; 30; 20 MG/100ML; MG/100ML; MG/100ML; MG/100ML
125 INJECTION, SOLUTION INTRAVENOUS CONTINUOUS
Status: DISCONTINUED | OUTPATIENT
Start: 2018-02-22 | End: 2018-02-24 | Stop reason: HOSPADM

## 2018-02-22 RX ORDER — FENTANYL CITRATE 50 UG/ML
INJECTION, SOLUTION INTRAMUSCULAR; INTRAVENOUS AS NEEDED
Status: DISCONTINUED | OUTPATIENT
Start: 2018-02-22 | End: 2018-02-22 | Stop reason: HOSPADM

## 2018-02-22 RX ORDER — EPHEDRINE SULFATE 50 MG/ML
12.5 INJECTION, SOLUTION INTRAVENOUS
Status: DISCONTINUED | OUTPATIENT
Start: 2018-02-22 | End: 2018-02-22 | Stop reason: HOSPADM

## 2018-02-22 RX ORDER — HYDROCORTISONE ACETATE PRAMOXINE HCL 2.5; 1 G/100G; G/100G
CREAM TOPICAL AS NEEDED
Status: DISCONTINUED | OUTPATIENT
Start: 2018-02-22 | End: 2018-02-24 | Stop reason: HOSPADM

## 2018-02-22 RX ORDER — OXYTOCIN/RINGER'S LACTATE 20/1000 ML
125-500 PLASTIC BAG, INJECTION (ML) INTRAVENOUS ONCE
Status: ACTIVE | OUTPATIENT
Start: 2018-02-22 | End: 2018-02-23

## 2018-02-22 RX ORDER — OXYTOCIN IN 5 % DEXTROSE 30/500 ML
PLASTIC BAG, INJECTION (ML) INTRAVENOUS
Status: COMPLETED
Start: 2018-02-22 | End: 2018-02-22

## 2018-02-22 RX ORDER — SODIUM CHLORIDE 0.9 % (FLUSH) 0.9 %
5-10 SYRINGE (ML) INJECTION EVERY 8 HOURS
Status: DISCONTINUED | OUTPATIENT
Start: 2018-02-22 | End: 2018-02-24 | Stop reason: HOSPADM

## 2018-02-22 RX ORDER — NALBUPHINE HYDROCHLORIDE 10 MG/ML
10 INJECTION, SOLUTION INTRAMUSCULAR; INTRAVENOUS; SUBCUTANEOUS
Status: DISCONTINUED | OUTPATIENT
Start: 2018-02-22 | End: 2018-02-22 | Stop reason: HOSPADM

## 2018-02-22 RX ORDER — FENTANYL CITRATE 50 UG/ML
100 INJECTION, SOLUTION INTRAMUSCULAR; INTRAVENOUS ONCE
Status: DISPENSED | OUTPATIENT
Start: 2018-02-22 | End: 2018-02-22

## 2018-02-22 RX ORDER — OXYCODONE AND ACETAMINOPHEN 5; 325 MG/1; MG/1
1 TABLET ORAL
Status: DISCONTINUED | OUTPATIENT
Start: 2018-02-22 | End: 2018-02-24 | Stop reason: HOSPADM

## 2018-02-22 RX ORDER — BUPIVACAINE HYDROCHLORIDE 5 MG/ML
30 INJECTION, SOLUTION EPIDURAL; INTRACAUDAL AS NEEDED
Status: DISCONTINUED | OUTPATIENT
Start: 2018-02-22 | End: 2018-02-22 | Stop reason: HOSPADM

## 2018-02-22 RX ORDER — BUPIVACAINE HYDROCHLORIDE 5 MG/ML
INJECTION, SOLUTION EPIDURAL; INTRACAUDAL AS NEEDED
Status: DISCONTINUED | OUTPATIENT
Start: 2018-02-22 | End: 2018-02-22 | Stop reason: HOSPADM

## 2018-02-22 RX ORDER — MINERAL OIL
OIL (ML) ORAL
Status: DISPENSED
Start: 2018-02-22 | End: 2018-02-23

## 2018-02-22 RX ADMIN — Medication 4 MILLI-UNITS/MIN: at 14:25

## 2018-02-22 RX ADMIN — SODIUM CHLORIDE, SODIUM LACTATE, POTASSIUM CHLORIDE, AND CALCIUM CHLORIDE 125 ML/HR: 600; 310; 30; 20 INJECTION, SOLUTION INTRAVENOUS at 13:20

## 2018-02-22 RX ADMIN — BUPIVACAINE HYDROCHLORIDE 1 ML: 5 INJECTION, SOLUTION EPIDURAL; INTRACAUDAL at 17:29

## 2018-02-22 RX ADMIN — SODIUM CHLORIDE, SODIUM LACTATE, POTASSIUM CHLORIDE, AND CALCIUM CHLORIDE 125 ML/HR: 600; 310; 30; 20 INJECTION, SOLUTION INTRAVENOUS at 18:04

## 2018-02-22 RX ADMIN — Medication 2 MILLI-UNITS/MIN: at 13:29

## 2018-02-22 RX ADMIN — Medication 8 MILLI-UNITS/MIN: at 18:04

## 2018-02-22 RX ADMIN — LIDOCAINE HYDROCHLORIDE AND EPINEPHRINE 5 ML: 15; 5 INJECTION, SOLUTION EPIDURAL at 17:29

## 2018-02-22 RX ADMIN — FENTANYL 0.2 MG/100ML-BUPIV 0.125%-NACL 0.9% EPIDURAL INJ 10 ML/HR: 2/0.125 SOLUTION at 17:38

## 2018-02-22 RX ADMIN — FENTANYL CITRATE 100 MCG: 50 INJECTION, SOLUTION INTRAMUSCULAR; INTRAVENOUS at 17:29

## 2018-02-22 NOTE — PROGRESS NOTES
1083-bedside report from RINA Jackson RN  [id number]-Dr Dipesh Ferguson in to see pt  Sloan Wade, E 3/50  0905-pt off monitor to move around  0930-walking halls, coping well, no pain  1030-Walchers maneuver  1045-Knee chest  1320-Pitocin started after discussing plan with Dr Dipesh Ferguson, pt resting on side with peanut ball  1450-up to walk  1600-coping well, reports increased UC intensity, on birthing ball leaning on bed and rocking hips  1645-E Dipesh Ferguson -  1650-knee chest  1700-voided  172-epidural placed by Dr Delicia Colon  174-turn to R side with peanut ball in place, resting  -E Dipesh Ferguson 10/100+  183-pushing started, pt has a beltran horse in right leg   -pushing off and on as able with beltran horse  -[facility] Dipesh Ferguson, baby girl, skin to skin  -breastfeeding well  -bedside report to MATTHEW Franco RN

## 2018-02-22 NOTE — PROGRESS NOTES
Labor Progress Note  Patient seen, fetal heart rate and contraction pattern evaluated, patient examined. Patient Vitals for the past 1 hrs:   BP Temp Pulse Resp   02/22/18 1604 129/76 98.5 °F (36.9 °C) 90 16       Physical Exam:  Cervical Exam:  4 -5cm dilated    70% effaced    0 station    Presenting Part: cephalic  Membranes:  Artificial Rupture of Membranes;  Amniotic Fluid: medium amount of clear fluid  Uterine Activity: every 4 minutes  Fetal Heart Rate: Reactive    Assessment/Plan:  Reassuring fetal status, Continue plan for vaginal delivery

## 2018-02-22 NOTE — ANESTHESIA PROCEDURE NOTES
Epidural Block    Start time: 2/22/2018 5:22 PM  End time: 2/22/2018 5:32 PM  Performed by: DUTCH Byrd  Authorized by: DUTCH Byrd     Pre-Procedure  Indication: labor epidural    Preanesthetic Checklist: patient identified, risks and benefits discussed, anesthesia consent, site marked, patient being monitored, timeout performed and anesthesia consent    Timeout Time: 17:22        Epidural:   Patient position:  Left lateral decubitus  Prep region:  Lumbar  Prep: Chlorhexidine    Location:  L2-3    Needle and Epidural Catheter:   Needle Type:  Tuohy  Needle Gauge:  17 G  Injection Technique:  Loss of resistance using air  Attempts:  1  Catheter Size:  19 G  Events: no blood with aspiration, no cerebrospinal fluid with aspiration, no paresthesia and negative aspiration test    Test Dose:  Negative and lidocaine 1.5% w/ epi    Assessment:   Catheter Secured:  Tegaderm and tape  Insertion:  Uncomplicated  Patient tolerance:  Patient tolerated the procedure well with no immediate complications

## 2018-02-22 NOTE — PROGRESS NOTES
The patient is doing well. Has been ambulating without complaints and is not noting any contractions. Fetal movement is active. We discussed the lack of uterine activity and she would like to begin pitocin at this time. She understands we can turn it off if she is to begin to have active contractions. Is considering epidural for pain management if needed. NST is reactive.

## 2018-02-22 NOTE — H&P
History & Physical    Name: Bin Bui MRN: [id number]  SSN: [id number]    YOB: 1980  Age: 30 y.o. Sex: female      Subjective:     Estimated Date of Delivery: 2/15/18  OB History    Para Term  AB Living   6 5 5   5   SAB TAB Ectopic Molar Multiple Live Births       0 5      # Outcome Date GA Lbr Ricky/2nd Weight Sex Delivery Anes PTL Lv   6 Current            5 Term 09/16/15 41w3d 12:15 / 00:25 4.23 kg F VAGINAL DELI EPIDURAL AN N CEM   4 Term 13 41w2d 06:52 / 00:16 3.884 kg M VAGINAL DELI EPIDURAL AN N CEM   3 Term 06/16/10 40w0d   F Vag-Spont   CEM   2 Term 08/15/08 40w0d  3.827 kg F Vag-Spont  N CEM   1 Term 06 40w0d  3.487 kg M Vag-Spont EPI N CEM      Obstetric Comments   1 adopted boy 10 y/o old        Ms. Barbara Tejeda is admitted with pregnancy at 41w0d for induction of labor due to post dates. Prenatal course was noted for AMA-no genetic testing was requested. Also complicated by gestational thrombocytopenia with her platelets yesterday at 103K. Please see prenatal records for details. Past Medical History:   Diagnosis Date    Anemia     taking iron    Complication of anesthesia     severe vomiting; requires antinausea medication first    Disease of blood and blood forming organ     Kidney disease     kidney stones    Psychiatric problem 2015    anxiety, panic attack after last delivery    Rh negative status during pregnancy 2013    rhogham given 2015    Thrombocytopenia ([address]     last labs plt were 118    Thyroid activity decreased     tested multiple times, continued to improve through pregnancy, on no meds at this time     Past Surgical History:   Procedure Laterality Date    HX OTHER SURGICAL      wisdom, echo to check for murmur     HX WISDOM TEETH EXTRACTION       Social History     Occupational History    Not on file.      Social History Main Topics    Smoking status: Never Smoker    Smokeless tobacco: Never Used    Alcohol use No    Drug use: No    Sexual activity: Yes     Partners: Male     Birth control/ protection: None     Family History   Problem Relation Age of Onset    Cancer Father      melanoma and prostatate    Elevated Lipids Father     Hypertension Father     Diabetes Father     Cancer Maternal Grandmother      uterine    Hypertension Maternal Grandmother     Heart Disease Maternal Grandmother     Cancer Maternal Grandfather      brain cancer    Heart Disease Maternal Grandfather     Hypertension Maternal Grandfather     Cancer Paternal Grandmother      breast cancer    Diabetes Paternal Grandmother     Cancer Paternal Grandfather      throid    Diabetes Paternal Grandfather     Hypertension Paternal Grandfather     Stroke Paternal Grandfather     Anxiety Mother      diabetes borderline, POTS, fibromyalgia, IBS    Hypertension Brother      etohsim       Allergies   Allergen Reactions    Gluten Diarrhea    Other Medication Nausea and Vomiting     Patient states that she is intolerant of general anesthesia and requires that an anti-nausea medication be given first.      Prior to Admission medications    Medication Sig Start Date End Date Taking? Authorizing Provider   Omega-3 Fatty Acids 60- mg cpDR Take  by mouth. Yes Historical Provider   IRON, FERROUS SULFATE, PO Take  by mouth daily. Yes Historical Provider   B.infantis-B.ani-B.long-B.bifi (PROBIOTIC 4X) 10-15 mg TbEC Take  by mouth. Yes Historical Provider   PRENATAL VITS W-CA,FE,FA,<1MG, (PRENATAL VITAMIN PO) Take 1 Tab by mouth nightly. Yes Historical Provider        Review of Systems: A comprehensive review of systems was negative except for that written in the History of Present Illness.     Objective:     Vitals:  Vitals:    02/22/18 0700 02/22/18 0712 02/22/18 0744   BP: 130/80 126/79 119/80   Pulse: 98 96 97   Resp: 16  14   Temp: 98.4 °F (36.9 °C)  98.5 °F (36.9 °C)        Physical Exam:  Abdomen: soft, nontender  Membranes: Intact  Fetal Heart Rate: Reactive      Cervix 50/3/-3/cephalic AROM of clear fluid      Prenatal Labs:   Lab Results   Component Value Date/Time    ABO/Rh(D) A NEGATIVE 02/21/2018 08:30 AM    Rubella, External immune  07/11/2017    HBsAg, External negative 07/11/2017    HIV, External non reactive 07/11/2017    RPR, External non reactive 06/21/2013    ABO,Rh A negative  07/11/2017    GrBStrep, External negative  01/22/2018       Impression/Plan:     Active Problems:    Pregnancy (9/16/2015)         Plan: Admit for induction of labor. Group B Strep negative.     Signed By:  Ac Morales MD     February 22, 2018

## 2018-02-22 NOTE — IP AVS SNAPSHOT
[address] [facility] [address] 
[phone] Patient: Zehra Rose MRN: [id number] :1980 About your hospitalization You were admitted on:  2018 You last received care in the:  [facility] [address] [facility] You were discharged on:  2018 Why you were hospitalized Your primary diagnosis was:  Not on File Your diagnoses also included:  Pregnancy, Normal Delivery Follow-up Information Follow up With Details Comments Contact Info Noel Correia MD   [place] [address] [address] [facility] [address] [facility] 
[phone] Murtaza Villaseñor MD Schedule an appointment as soon as possible for a visit in 7 weeks  [facility] [address] [facility] 13 
[phone] [facility] Call As needed, For breastfeeding assistance/ questions. 200 [facility] [address] [facility] [address] [place] [address] 
[phone] Discharge Orders None A check leon indicates which time of day the medication should be taken. My Medications START taking these medications Instructions Each Dose to Equal  
 Morning Noon Evening Bedtime  
 ibuprofen 600 mg tablet Commonly known as:  MOTRIN Your last dose was:  800 mg given at 6:09 PM  
Your next dose is:  2:09 am  
   
 Take 1 Tab by mouth every six (6) hours as needed for Pain. 600 mg CONTINUE taking these medications Instructions Each Dose to Equal  
 Morning Noon Evening Bedtime PRENATAL VITAMIN PO Your next dose is:  anytime Take 1 Tab by mouth nightly. 1 Tab STOP taking these medications IRON (FERROUS SULFATE) PO Omega-3 Fatty Acids 60- mg Cpdr  
   
  
 PROBIOTIC 4X 10-15 mg Tbec Generic drug:  B.infantis-B.ani-B.long-B.bifi Where to Get Your Medications Information on where to get these meds will be given to you by the nurse or doctor. ! Ask your nurse or doctor about these medications  
  ibuprofen 600 mg tablet Discharge Instructions POSTPARTUM DISCHARGE INSTRUCTIONS Name:  Татьяна Awad YOB: 1980 Admission Diagnosis:  Pregnancy Discharge Diagnosis:   
Problem List as of 2/23/2018  Date Reviewed: 2/23/2018 Codes Class Noted - Resolved Normal delivery ICD-10-CM: O80, Z37.9 ICD-9-CM: 611  2/23/2018 - Present Rh negative status during pregnancy ICD-10-CM: O09.899 ICD-9-CM: V23.89  9/22/2013 - Present RESOLVED: Pregnancy ICD-10-CM: Z34.90 ICD-9-CM: V22.2  9/16/2015 - 2/23/2018 Attending Physician:  Elvira Barlow MD 
 
Delivery Type:  Vaginal Childbirth with Episiotomy, Laceration or Tear: What To Expect At [address] [facility] Your body will slowly heal in the next few weeks. It is easy to get too tired and overwhelmed during the first weeks after your baby is born. Changes in your hormones can shift your mood without warning. You may find it hard to meet the extra demands on your energy and time. Take it easy on yourself. Follow-up care is a key part of your treatment and safety. Be sure to make and go to all appointments, and call your doctor if you are having problems. It's also a good idea to know your test results and keep a list of the medicines you take. How can you care for yourself at home? Vaginal Bleeding and Cramps · After delivery, you will have a bloody discharge from the vagina. This will turn pink within a week and then white or yellow after about 10 days. It may last for 2 to 4 weeks or longer, until the uterus has healed. Use pads instead of tampons until you stop bleeding. · Do not worry if you pass some blood clots, as long as they are smaller than a golf ball.  If you have a tear or stitches in your vaginal area, change the pad at least every 4 hours to prevent soreness and infection. · You may have cramps for the first few days after childbirth. These are normal and occur as the uterus shrinks to normal size. Take an over-the-counter pain medicine, such as acetaminophen (Tylenol), ibuprofen (Advil, Motrin), or naproxen (Aleve), for cramps. Read and follow all instructions on the label. Do not take aspirin, because it can cause more bleeding. Do not take acetaminophen (Tylenol) and other acetaminophen containing medications (i.e. Percocet) at the same time. Episiotomy, Lacerations or Tears · If you have stitches, they will dissolve on their own and do not need to be removed. · Put ice or a cold pack on your painful area for 10 to 20 minutes at a time, several times a day, for the first few days. Put a thin cloth between the ice and your skin. · Sit in a few inches of warm water (sitz bath) 3 times a day and after bowel movements. The warm water helps with pain and itching. If you do not have a tub, a warm shower might help. Breast fullness · Your breasts may overfill (engorge) in the first few days after delivery. To help milk flow and to relieve pain, warm your breasts in the shower or by using warm, moist towels before nursing. · If you are not nursing, do not put warmth on your breasts or touch your breasts. Wear a tight bra or sports bra and use ice until the fullness goes away. This usually takes 2 to 3 days. · Put ice or a cold pack on your breast after nursing to reduce swelling and pain. Put a thin cloth between the ice and your skin. Activity · Eat a balanced diet. Do not try to lose weight by cutting calories. Keep taking your prenatal vitamins, or take a multivitamin. · Get as much rest as you can. Try to take naps when your baby sleeps during the day. · Get some exercise every day. But do not do any heavy exercise until your doctor says it is okay. · Wait until you are healed (about 4 to 6 weeks) before you have sexual intercourse. Your doctor will tell you when it is okay to have sex. · Talk to your doctor about birth control. You can get pregnant even before your period returns. Also, you can get pregnant while you are breast-feeding. Mental Health · Many women get the \"baby blues\" during the first few days after childbirth. You may lose sleep, feel irritable, and cry easily. You may feel happy one minute and sad the next. Hormone changes are one cause of these emotional changes. Also, the demands of a new baby, along with visits from relatives or other family needs, add to a mother's stress. The \"baby blues\" often peak around the fourth day. Then they ease up in less than 2 weeks. · If your moodiness or anxiety lasts for more than 2 weeks, or if you feel like life is not worth living, you may have postpartum depression. This is different for each mother. Some mothers with serious depression may worry intensely about their infant's well-being. Others may feel distant from their child. Some mothers might even feel that they might harm their baby. A mother may have signs of paranoia, wondering if someone is watching her. · With all the changes in your life, you may not know if you are depressed. Pregnancy sometimes causes changes in how you feel that are similar to the symptoms of depression. · Symptoms of depression include: · Feeling sad or hopeless and losing interest in daily activities. These are the most common symptoms of depression. · Sleeping too much or not enough. · Feeling tired. You may feel as if you have no energy. · Eating too much or too little. · POSTPARTUM SUPPORT INTERNATIONAL (PSI) offers a Warm line; Chat with the Expert phone sessions; Information and Articles about Pregnancy and Postpartum Mood Disorders;  Comprehensive List of Free Support Groups; Knowledgeable local coordinators who will offer support, information, and resources; Guide to Resources on [place] Resume; Calendar of events in the  mood disorders community; Latest News and Research; and [facility] [address] for [employer]. Remember - You are not alone; You are not to blame; With help, you will be well. 9-429-113-PPD(1028). WWW. POSTPARTUM. NET · Writing or talking about death, such as writing suicide notes or talking about guns, knives, or pills. Keep the numbers for these national suicide hotlines: 7-926-930-TALK (1-668.686.8140) and 6-798-OAMPSAX (6-337.950.5308). If you or someone you know talks about suicide or feeling hopeless, get help right away. Constipation and Hemorrhoids Drink plenty of fluids, enough so that your urine is light yellow or clear like water. If you have kidney, heart, or liver disease and have to limit fluids, talk with your doctor before you increase the amount of fluids you drink. · Eat plenty of fiber each day. Have a bran muffin or bran cereal for breakfast, and try eating a piece of fruit for a mid-afternoon snack. · For painful, itchy hemorrhoids, put ice or a cold pack on the area several times a day for 10 minutes at a time. Follow this by putting a warm compress on the area for another 10 to 20 minutes or by sitting in a shallow, warm bath. When should you call for help? Call 911 anytime you think you may need emergency care. For example, call if: 
· You are thinking of hurting yourself, your baby, or anyone else. · You passed out (lost consciousness). · You have symptoms of a blood clot in your lung (called a pulmonary embolism). These may include: 
· Sudden chest pain. · Trouble breathing. · Coughing up blood. Call your doctor now or seek immediate medical care if: 
· You have severe vaginal bleeding. · You are soaking through a pad each hour for 2 or more hours. · Your vaginal bleeding seems to be getting heavier or is still bright red 4 days after delivery. · You are dizzy or lightheaded, or you feel like you may faint. · You are vomiting or cannot keep fluids down. · You have a fever. · You have new or more belly pain. · You pass tissue (not just blood). · Your vaginal discharge smells bad. · Your belly feels tender or full and hard. · Your breasts are continuously painful or red. · You feel sad, anxious, or hopeless for more than a few days. · You have sudden, severe pain in your belly. · You have symptoms of a blood clot in your leg (called a deep vein thrombosis), such as: 
· Pain in your calf, back of the knee, thigh, or groin. · Redness and swelling in your leg or groin. · You have symptoms of preeclampsia, such as: 
· Sudden swelling of your face, hands, or feet. · New vision problems (such as dimness or blurring). · A severe headache. · Your blood pressure is higher than it should be or rises suddenly. · You have new nausea or vomiting. Watch closely for changes in your health, and be sure to contact your doctor if you have any problems. Additional Information:  Not applicable These are general instructions for a healthy lifestyle: No smoking/ No tobacco products/ Avoid exposure to second hand smoke Surgeon General's Warning:  Quitting smoking now greatly reduces serious risk to your health. Obesity, smoking, and sedentary lifestyle greatly increases your risk for illness A healthy diet, regular physical exercise & weight monitoring are important for maintaining a healthy lifestyle Recognize signs and symptoms of STROKE: 
 
F-face looks uneven A-arms unable to move or move unevenly S-speech slurred or non-existent T-time-call 911 as soon as signs and symptoms begin - DO NOT go  
    back to bed or wait to see if you get better - TIME IS BRAIN. I have had the opportunity to make my options or choices for discharge. I have received and understand these instructions. [place] Announcement We are excited to announce that we are making your provider's discharge notes available to you in [email or web address]. You will see these notes when they are completed and signed by the physician that discharged you from your recent hospital stay. If you have any questions or concerns about any information you see in [email or web address], please call the Health Information Department where you were seen or reach out to your Primary Care Provider for more information about your plan of care. Introducing hospitals & HEALTH SERVICES! Dear Hiram Crockett: 
Thank you for requesting a [email or web address] account. Our records indicate that you already have an active [email or web address] account. You can access your account anytime at [email or web address] Did you know that you can access your hospital and ER discharge instructions at any time in [email or web address]? You can also review all of your test results from your hospital stay or ER visit. Additional Information If you have questions, please visit the Frequently Asked Questions section of the [email or web address] website at [email or web address] Remember, [email or web address] is NOT to be used for urgent needs. For medical emergencies, dial 911. Now available from your iPhone and Android! Providers Seen During Your Hospitalization Provider Specialty Primary office phone Veto Allen MD Obstetrics & Gynecology [phone] Immunizations Administered for This Admission Name Date MMR  Deferred () Rho(D) Immune Globulin - IM 2/23/2018 Your Primary Care Physician (PCP) Primary Care Physician Office Phone Office Fax Raimundo Vital [phone] You are allergic to the following Allergen Reactions Gluten Diarrhea Other Medication Nausea and Vomiting Patient states that she is intolerant of general anesthesia and requires that an anti-nausea medication be given first.   
  
Recent Documentation Breastfeeding? OB Status Smoking Status Unknown Recent pregnancy Never Smoker Emergency Contacts Name Discharge Info Relation Home Work Mobile [address] CAREGIVER [3] Spouse [3] [phone] Patient Belongings The following personal items are in your possession at time of discharge: 
  Dental Appliances: None  Visual Aid: None      Home Medications: None   Jewelry: None  Clothing: With patient    Other Valuables: Purse, With patient Please provide this summary of care documentation to your next provider. Signatures-by signing, you are acknowledging that this After Visit Summary has been reviewed with you and you have received a copy. Patient Signature:  ____________________________________________________________ Date:  ____________________________________________________________  
  
[facility] Provider Signature:  ____________________________________________________________ Date:  ____________________________________________________________

## 2018-02-22 NOTE — PROGRESS NOTES
1370: pt arrived from home for postdates induction, thrombocytopenia. Denies bleeding or leaking fluid, reports active fetal movement    0740: Bedside and Verbal shift change report given to Sarabjit Howard RN (oncoming nurse) by RINA Hurst RN (offgoing nurse). Report included the following information SBAR, Intake/Output, MAR and Recent Results.

## 2018-02-23 VITALS
SYSTOLIC BLOOD PRESSURE: 124 MMHG | RESPIRATION RATE: 14 BRPM | TEMPERATURE: 98 F | OXYGEN SATURATION: 100 % | HEART RATE: 82 BPM | DIASTOLIC BLOOD PRESSURE: 81 MMHG

## 2018-02-23 PROCEDURE — 36415 COLL VENOUS BLD VENIPUNCTURE: CPT | Performed by: OBSTETRICS & GYNECOLOGY

## 2018-02-23 PROCEDURE — 86900 BLOOD TYPING SEROLOGIC ABO: CPT | Performed by: OBSTETRICS & GYNECOLOGY

## 2018-02-23 PROCEDURE — 85461 HEMOGLOBIN FETAL: CPT | Performed by: OBSTETRICS & GYNECOLOGY

## 2018-02-23 PROCEDURE — 74011250636 HC RX REV CODE- 250/636: Performed by: OBSTETRICS & GYNECOLOGY

## 2018-02-23 PROCEDURE — 74011250637 HC RX REV CODE- 250/637: Performed by: OBSTETRICS & GYNECOLOGY

## 2018-02-23 RX ORDER — IBUPROFEN 600 MG/1
600 TABLET ORAL
Qty: 30 TAB | Refills: 0 | Status: SHIPPED | OUTPATIENT
Start: 2018-02-23 | End: 2019-05-16 | Stop reason: ALTCHOICE

## 2018-02-23 RX ADMIN — IBUPROFEN 800 MG: 400 TABLET, FILM COATED ORAL at 18:09

## 2018-02-23 RX ADMIN — HUMAN RHO(D) IMMUNE GLOBULIN 0.3 MG: 300 INJECTION, SOLUTION INTRAMUSCULAR at 17:47

## 2018-02-23 RX ADMIN — IBUPROFEN 800 MG: 400 TABLET, FILM COATED ORAL at 09:32

## 2018-02-23 NOTE — ROUTINE PROCESS
Bedside shift change report given to [address] [place] [address] (oncoming nurse) by TAMARA Shine RN (offgoing nurse). Report included the following information SBAR, Procedure Summary, Intake/Output, MAR and Recent Results.

## 2018-02-23 NOTE — PROGRESS NOTES
0230 Patient up to bathroom,voided large amount,check void completed. 0600 Rhogam studies obtained and sent to lab.

## 2018-02-23 NOTE — ROUTINE PROCESS
2200 TRANSFER - IN REPORT:    Verbal report received from Sirisha Agrawal RN on aFhad Ken  being received from L&D for routine progression of care      Report consisted of patients Situation, Background, Assessment and   Recommendations(SBAR). Information from the following report(s) SBAR, Kardex, Intake/Output and MAR was reviewed with the receiving nurse. Opportunity for questions and clarification was provided. Assessment completed upon patients arrival to unit and care assumed. 2220 Patient up with nurse to void. Gait steady. Pericare completed. Patient returned to bed.

## 2018-02-23 NOTE — L&D DELIVERY NOTE
Delivery Summary    Patient: Ernestina Torrez MRN: [id number]  SSN: [id number]    YOB: 1980  Age: 30 y.o. Sex: female        Labor Events:    Labor: No    Rupture Date: 2018    Rupture Time: 8:31 AM    Rupture Type AROM    Amniotic Fluid Volume:      Amniotic Fluid Description:         Induction: AROM; Oxytocin        Augmentation: None    Labor Complications: Additional Complications:        Cervical Ripening:       None      Delivery Events:  Episiotomy: None    Laceration(s): None      Repaired: None     Number of Repair Packets:      Suture Type and Size:         Estimated Blood Loss (ml): 150        Information for the patient's :  Roshan Bedolla [[id number]]     Delivery Summary - Baby    Delivery Date: 2018   Delivery Time: 6:57 PM   Delivery Type: Vaginal, Spontaneous Delivery  Sex:  female  Gestational Age: 41w0d  Delivery Clinician:  Catracho Downs  Living?: Living   Delivery Location: &D  10           APGARS  One minute Five minutes Ten minutes   Skin Color: 1    1       Heart Rate: 2   2         Reflex Irritability: 2   2         Muscle Tone: 2   2       Respiration: 2   2         Total: 9   9           Presentation: Vertex  Position:        Resuscitation Method:  Tactile Stimulation     Meconium Stained: None    Cord Information: 3 Vessels   Complications: None  Cord Blood Sent?:  Yes    Blood Gases Sent?:  No    Placenta:  Date/Time:   7:16 PM  Removal: Spontaneous      Appearance: Normal      Measurements:  Birth Weight:      Birth Length:     Head Circumference:       Chest Circumference:      Abdominal Girth:       Other Providers:   LUIS ARMANDO JOVEL;OSCAR STEELE;AMINA ALICIA Obstetrician;Primary Nurse;Primary [place] Nurse           Cord Blood Results:  Information for the patient's :  Roshan Bedolla [[id number]]     Lab Results   Component Value Date/Time    ABORH A POSITIVE 2018 07:10 PM PCTDIG NEG 2018 07:10 PM    BILI IF DIRECT MARILYN POSITIVE, BILIRUBIN TO FOLLOW 2018 07:10 PM     Information for the patient's :  Chanelle Jara [[id number]]   No results found for: APH, APCO2, APO2, AHCO3, ABEC, ABDC, O2ST, SITE, [place], PHI, [place], PO2I, HCO3I, SO2I, IBD     Information for the patient's :  Chanelle Jara [[id number]]   No results found for: EPHV, PCO2V, PO2V, HCO3V, O2STV, EBDV

## 2018-02-23 NOTE — PROGRESS NOTES
1940~  Report and transfer of care from [address] [place] [address]     2200~  TRANSFER - OUT REPORT:    Verbal report given to RINA Schmitt RN(name) on Laveta Push  being transferred to (unit) for routine progression of care       Report consisted of patients Situation, Background, Assessment and   Recommendations(SBAR). Information from the following report(s) SBAR, Intake/Output, MAR and Recent Results was reviewed with the receiving nurse. Lines:   Peripheral IV 02/22/18 Right Wrist (Active)   Site Assessment Clean, dry, & intact 2/22/2018  7:00 AM   Phlebitis Assessment 0 2/22/2018  7:00 AM   Infiltration Assessment 0 2/22/2018  7:00 AM   Dressing Status Clean, dry, & intact 2/22/2018  7:00 AM   Dressing Type Transparent 2/22/2018  7:00 AM   Hub Color/Line Status Pink 2/22/2018  7:00 AM   Action Taken Blood drawn 2/22/2018  7:00 AM        Opportunity for questions and clarification was provided.       Patient transported with:   Registered Nurse

## 2018-02-23 NOTE — DISCHARGE SUMMARY
Obstetrical Discharge Summary     Name: Bria Najera MRN: [id number]  SSN: [id number]    YOB: 1980  Age: 30 y.o. Sex: female      Admit Date: 2/22/2018    Discharge Date: 2/23/2018    Discharge Diagnoses:   Pregnancy  Vaginal delivery     Procedure Preformed: * No surgery found *    Admitting Physician: Gerry Aiken MD     Attending Physician:  Gerry Aiken MD     Condition on Discharge:   good    Disposition:  Home    Additional Diagnoses: Active Problems:    Normal delivery (2/23/2018)        Lab Results   Component Value Date/Time    Rubella, External immune  07/11/2017    GrBStrep, External negative  01/22/2018     Recent Labs      02/22/18   0714   HGB  11.8       Hospital Course: Normal hospital course following the delivery. Patient Instructions:   Current Discharge Medication List      START taking these medications    Details   ibuprofen (MOTRIN) 600 mg tablet Take 1 Tab by mouth every six (6) hours as needed for Pain. Qty: 30 Tab, Refills: 0         CONTINUE these medications which have NOT CHANGED    Details   PRENATAL VITS W-CA,FE,FA,<1MG, (PRENATAL VITAMIN PO) Take 1 Tab by mouth nightly. STOP taking these medications       Omega-3 Fatty Acids 60- mg cpDR Comments:   Reason for Stopping:         IRON, FERROUS SULFATE, PO Comments:   Reason for Stopping:         B.infantis-B.ani-B.long-B.bifi (PROBIOTIC 4X) 10-15 mg TbEC Comments:   Reason for Stopping:               Reference my discharge instructions.     FOLLOW-UP Care:  Ambulate in house, No sex for 6 weeks, No driving while on analgesics and No heavy lifting for 6 weeks  Regular Diet  Wound Care: pericare as directed  Follow-up Information     Follow up With Details Comments [address] [facility] [address]  Internal Medicine [facility] [address] [facility]  [phone]          Current Discharge Medication List      START taking these medications    Details ibuprofen (MOTRIN) 600 mg tablet Take 1 Tab by mouth every six (6) hours as needed for Pain. Qty: 30 Tab, Refills: 0         CONTINUE these medications which have NOT CHANGED    Details   PRENATAL VITS W-CA,FE,FA,<1MG, (PRENATAL VITAMIN PO) Take 1 Tab by mouth nightly.          STOP taking these medications       Omega-3 Fatty Acids 60- mg cpDR Comments:   Reason for Stopping:         IRON, FERROUS SULFATE, PO Comments:   Reason for Stopping:         B.infantis-B.ani-B.long-B.bifi (PROBIOTIC 4X) 10-15 mg TbEC Comments:   Reason for Stopping:                 Signed By:  Luis Sheridan MD     February 23, 2018                       BST

## 2018-02-23 NOTE — LACTATION NOTE
This note was copied from a baby's chart. Initial Lactation Consultation - Baby born vaginally yesterday to a  mom at 36 weeks. Mom nursed her other children and says this baby is latching and breast feeding well. Mom has no concerns about breast feeding and has no questions for lactation.

## 2018-02-23 NOTE — LACTATION NOTE
This note was copied from a baby's chart.   1400:   Couplet Interdisciplinary Rounds     MATERNAL    Daily Goal:     Influenza screening completed: YES and Patient refused   Tdap screening completed: YES and Patient refused   Mando Castro, will give prior to discharge  MMR Given:N/A    VTE Prophylaxis: Not indicated, per Provider order    EPDS: not completed yet        Patient Name: Frantz Skaggs Diagnosis: [place]  Single liveborn, born in hospital, delivered by vaginal delivery   Date of Admission: 2018 LOS: 1  Gestational Age: Gestational Age: 41w0d     Daily Goal:     Birth Weight: 3.165 kg Current Weight: Weight: 3.165 kg (Filed from Delivery Summary)  % of Weight Change: 0%    Feeding: breastfeeding  [place] Metabolic Screen: NO    Hepatitis B:  NO and Patient refused    Discharge Bili:  NO  Car Seat Trial, if needed:  N/A    Patient/Family Teaching Needs: infant care    Days before discharge: Discharge pending; requesting early discharge today after  screening drawn    In Attendance:  Nursing

## 2018-02-23 NOTE — PROGRESS NOTES
Post-Partum Day Number 1Progress Note    Arabella Tidwell     Assessment: Active Problems:    Normal delivery (2018)    Gestational thrombocytopenia      Doing well, post partum day 1, desires early dc  Plan:   1. Discharge home today  2. Follow up in office in 6 weeks with Ac Morales MD   3. Post partum activity advised, diet as tolerated  4. Discharge Medications: ibuprofen, percocet and medications prior to admission    Information for the patient's :  Ariana Galan [[id number]]   Vaginal, Spontaneous Delivery   Patient doing well without significant complaint. Voiding without difficulty, normal lochia. Current Facility-Administered Medications   Medication Dose Route Frequency    lactated Ringers infusion  125 mL/hr IntraVENous CONTINUOUS    sodium chloride (NS) flush 5-10 mL  5-10 mL IntraVENous Q8H    fentaNYL 2mcg/mL - bupivacaine 0.125% pf epidural  1-16 mL/hr Epidural CONTINUOUS    measles, mumps & rubella Vacc (PF) (M-M-R II) injection 0.5 mL  0.5 mL SubCUTAneous PRIOR TO DISCHARGE    ibuprofen (MOTRIN) tablet 800 mg  800 mg Oral Q8H       Vitals:  Visit Vitals    /73 (BP 1 Location: Left arm, BP Patient Position: At rest)    Pulse 81    Temp 97.9 °F (36.6 °C)    Resp 14    LMP 2017 (Exact Date)    SpO2 100%    Breastfeeding Unknown     Temp (24hrs), Av.2 °F (36.8 °C), Min:97.8 °F (36.6 °C), Max:98.6 °F (37 °C)      Exam:         Patient without distress. Abdomen soft, fundus firm, nontender                 Lower extremities are negative for swelling, cords or tenderness.     Labs:     Lab Results   Component Value Date/Time    WBC 6.8 2018 07:14 AM    WBC 6.2 2018 08:30 AM    WBC 8.2 2017 05:37 PM    HGB 11.8 2018 07:14 AM    HGB 11.6 2018 08:30 AM    HGB 11.9 2017 05:37 PM    HCT 34.6 (L) 2018 07:14 AM    HCT 35.0 2018 08:30 AM    HCT 36.2 2017 05:37 PM    PLATELET 424 (L) 02/22/2018 07:14 AM    PLATELET 568 (L) 19/22/6741 08:30 AM    PLATELET 949 (L) 56/05/7965 05:37 PM       No results found for this or any previous visit (from the past 24 hour(s)).

## 2018-02-23 NOTE — ROUTINE PROCESS
5575: Bedside and Verbal shift change report given to KALIE Underwood RN (oncoming nurse) by TAMARA Rankin RN (offgoing nurse). Report included the following information SBAR, Intake/Output, MAR and Recent Results.

## 2018-02-24 LAB
ABO + RH BLD: NORMAL
BLD PROD TYP BPU: NORMAL
BPU ID: NORMAL
FETAL SCREEN,FMHS: NORMAL
STATUS OF UNIT,%ST: NORMAL
UNIT DIVISION, %UDIV: 0
WEAK D AG RBC QL: NORMAL

## 2018-02-24 NOTE — PROGRESS NOTES
2035: Discharge instructions given and reviewed with pt. All questions answered. Pt given prescription with medication handout. Pt reminded to schedule 6 week follow up appointment with Dr Lorenda Lesches. Pt verbalized understanding. Pt discharged from unit at this time via wheelchair, holding infant in arms, accompanied by hospital staff. All belongings taken with pt.

## 2018-02-24 NOTE — DISCHARGE INSTRUCTIONS
POSTPARTUM DISCHARGE INSTRUCTIONS       Name:  Dodie Mckeon  YOB: 1980  Admission Diagnosis:  Pregnancy     Discharge Diagnosis:    Problem List as of 2/23/2018  Date Reviewed: 2/23/2018          Codes Class Noted - Resolved    Normal delivery ICD-10-CM: O80, Z37.9  ICD-9-CM: 237  2/23/2018 - Present        Rh negative status during pregnancy ICD-10-CM: O09.899  ICD-9-CM: V23.89  9/22/2013 - Present        RESOLVED: Pregnancy ICD-10-CM: Z34.90  ICD-9-CM: V22.2  9/16/2015 - 2/23/2018            Attending Physician:  Paul Mae MD    Delivery Type:  Vaginal Childbirth with Episiotomy, Laceration or Tear: What To Expect At [address] [place] [address] will slowly heal in the next few weeks. It is easy to get too tired and overwhelmed during the first weeks after your baby is born. Changes in your hormones can shift your mood without warning. You may find it hard to meet the extra demands on your energy and time. Take it easy on yourself. Follow-up care is a key part of your treatment and safety. Be sure to make and go to all appointments, and call your doctor if you are having problems. It's also a good idea to know your test results and keep a list of the medicines you take. How can you care for yourself at home? Vaginal Bleeding and Cramps  · After delivery, you will have a bloody discharge from the vagina. This will turn pink within a week and then white or yellow after about 10 days. It may last for 2 to 4 weeks or longer, until the uterus has healed. Use pads instead of tampons until you stop bleeding. · Do not worry if you pass some blood clots, as long as they are smaller than a golf ball. If you have a tear or stitches in your vaginal area, change the pad at least every 4 hours to prevent soreness and infection. · You may have cramps for the first few days after childbirth. These are normal and occur as the uterus shrinks to normal size.  Take an over-the-counter pain medicine, such as acetaminophen (Tylenol), ibuprofen (Advil, Motrin), or naproxen (Aleve), for cramps. Read and follow all instructions on the label. Do not take aspirin, because it can cause more bleeding. Do not take acetaminophen (Tylenol) and other acetaminophen containing medications (i.e. Percocet) at the same time. Episiotomy, Lacerations or Tears  · If you have stitches, they will dissolve on their own and do not need to be removed. · Put ice or a cold pack on your painful area for 10 to 20 minutes at a time, several times a day, for the first few days. Put a thin cloth between the ice and your skin. · Sit in a few inches of warm water (sitz bath) 3 times a day and after bowel movements. The warm water helps with pain and itching. If you do not have a tub, a warm shower might help. Breast fullness  · Your breasts may overfill (engorge) in the first few days after delivery. To help milk flow and to relieve pain, warm your breasts in the shower or by using warm, moist towels before nursing. · If you are not nursing, do not put warmth on your breasts or touch your breasts. Wear a tight bra or sports bra and use ice until the fullness goes away. This usually takes 2 to 3 days. · Put ice or a cold pack on your breast after nursing to reduce swelling and pain. Put a thin cloth between the ice and your skin. Activity  · Eat a balanced diet. Do not try to lose weight by cutting calories. Keep taking your prenatal vitamins, or take a multivitamin. · Get as much rest as you can. Try to take naps when your baby sleeps during the day. · Get some exercise every day. But do not do any heavy exercise until your doctor says it is okay. · Wait until you are healed (about 4 to 6 weeks) before you have sexual intercourse. Your doctor will tell you when it is okay to have sex. · Talk to your doctor about birth control. You can get pregnant even before your period returns.  Also, you can get pregnant while you are breast-feeding. Mental Health  · Many women get the \"baby blues\" during the first few days after childbirth. You may lose sleep, feel irritable, and cry easily. You may feel happy one minute and sad the next. Hormone changes are one cause of these emotional changes. Also, the demands of a new baby, along with visits from relatives or other family needs, add to a mother's stress. The \"baby blues\" often peak around the fourth day. Then they ease up in less than 2 weeks. · If your moodiness or anxiety lasts for more than 2 weeks, or if you feel like life is not worth living, you may have postpartum depression. This is different for each mother. Some mothers with serious depression may worry intensely about their infant's well-being. Others may feel distant from their child. Some mothers might even feel that they might harm their baby. A mother may have signs of paranoia, wondering if someone is watching her. · With all the changes in your life, you may not know if you are depressed. Pregnancy sometimes causes changes in how you feel that are similar to the symptoms of depression. · Symptoms of depression include:  · Feeling sad or hopeless and losing interest in daily activities. These are the most common symptoms of depression. · Sleeping too much or not enough. · Feeling tired. You may feel as if you have no energy. · Eating too much or too little. · POSTPARTUM SUPPORT INTERNATIONAL (PSI) offers a Warm line; Chat with the Expert phone sessions; Information and Articles about Pregnancy and Postpartum Mood Disorders; Comprehensive List of Free Support Groups; Knowledgeable local coordinators who will offer support, information, and resources; Guide to Resources on Open[employer]ark; Calendar of events in the  mood disorders community; Latest News and Research; and [facility] [address] for [employer]. Remember - You are not alone; You are not to blame; With help, you will be well. 6-530-431-PPD(7605). WWW. POSTPARTUM. NET    · Writing or talking about death, such as writing suicide notes or talking about guns, knives, or pills. Keep the numbers for these national suicide hotlines: 8-540-102-TALK (0-156.961.2420) and 8-186-ULMYVFO (6-411.527.9478). If you or someone you know talks about suicide or feeling hopeless, get help right away. Constipation and Hemorrhoids  Drink plenty of fluids, enough so that your urine is light yellow or clear like water. If you have kidney, heart, or liver disease and have to limit fluids, talk with your doctor before you increase the amount of fluids you drink. · Eat plenty of fiber each day. Have a bran muffin or bran cereal for breakfast, and try eating a piece of fruit for a mid-afternoon snack. · For painful, itchy hemorrhoids, put ice or a cold pack on the area several times a day for 10 minutes at a time. Follow this by putting a warm compress on the area for another 10 to 20 minutes or by sitting in a shallow, warm bath. When should you call for help? Call 911 anytime you think you may need emergency care. For example, call if:  · You are thinking of hurting yourself, your baby, or anyone else. · You passed out (lost consciousness). · You have symptoms of a blood clot in your lung (called a pulmonary embolism). These may include:  · Sudden chest pain. · Trouble breathing. · Coughing up blood. Call your doctor now or seek immediate medical care if:  · You have severe vaginal bleeding. · You are soaking through a pad each hour for 2 or more hours. · Your vaginal bleeding seems to be getting heavier or is still bright red 4 days after delivery. · You are dizzy or lightheaded, or you feel like you may faint. · You are vomiting or cannot keep fluids down. · You have a fever. · You have new or more belly pain. · You pass tissue (not just blood). · Your vaginal discharge smells bad. · Your belly feels tender or full and hard.   · Your breasts are continuously painful or red. · You feel sad, anxious, or hopeless for more than a few days. · You have sudden, severe pain in your belly. · You have symptoms of a blood clot in your leg (called a deep vein thrombosis), such as:  · Pain in your calf, back of the knee, thigh, or groin. · Redness and swelling in your leg or groin. · You have symptoms of preeclampsia, such as:  · Sudden swelling of your face, hands, or feet. · New vision problems (such as dimness or blurring). · A severe headache. · Your blood pressure is higher than it should be or rises suddenly. · You have new nausea or vomiting. Watch closely for changes in your health, and be sure to contact your doctor if you have any problems. Additional Information:  Not applicable    These are general instructions for a healthy lifestyle:    No smoking/ No tobacco products/ Avoid exposure to second hand smoke    Surgeon General's Warning:  Quitting smoking now greatly reduces serious risk to your health. Obesity, smoking, and sedentary lifestyle greatly increases your risk for illness    A healthy diet, regular physical exercise & weight monitoring are important for maintaining a healthy lifestyle    Recognize signs and symptoms of STROKE:    F-face looks uneven    A-arms unable to move or move unevenly    S-speech slurred or non-existent    T-time-call 911 as soon as signs and symptoms begin - DO NOT go       back to bed or wait to see if you get better - TIME IS BRAIN. I have had the opportunity to make my options or choices for discharge. I have received and understand these instructions.

## 2018-02-25 LAB
ABO + RH BLD: NORMAL
BLD PROD TYP BPU: NORMAL
BLD PROD TYP BPU: NORMAL
BLOOD GROUP ANTIBODIES SERPL: NORMAL
BLOOD GROUP ANTIBODIES SERPL: NORMAL
BPU ID: NORMAL
BPU ID: NORMAL
CROSSMATCH RESULT,%XM: NORMAL
CROSSMATCH RESULT,%XM: NORMAL
SPECIMEN EXP DATE BLD: NORMAL
STATUS OF UNIT,%ST: NORMAL
STATUS OF UNIT,%ST: NORMAL
UNIT DIVISION, %UDIV: 0
UNIT DIVISION, %UDIV: 0

## 2018-05-10 ENCOUNTER — OFFICE VISIT (OUTPATIENT)
Dept: INTERNAL MEDICINE CLINIC | Age: 38
End: 2018-05-10

## 2018-05-10 VITALS
OXYGEN SATURATION: 97 % | RESPIRATION RATE: 16 BRPM | TEMPERATURE: 98 F | WEIGHT: 145 LBS | DIASTOLIC BLOOD PRESSURE: 86 MMHG | HEART RATE: 78 BPM | HEIGHT: 67 IN | BODY MASS INDEX: 22.76 KG/M2 | SYSTOLIC BLOOD PRESSURE: 136 MMHG

## 2018-05-10 DIAGNOSIS — N20.0 NEPHROLITHIASIS: ICD-10-CM

## 2018-05-10 DIAGNOSIS — R53.83 FATIGUE, UNSPECIFIED TYPE: ICD-10-CM

## 2018-05-10 DIAGNOSIS — E55.9 VITAMIN D DEFICIENCY: ICD-10-CM

## 2018-05-10 DIAGNOSIS — Z00.00 WELL ADULT EXAM: Primary | ICD-10-CM

## 2018-05-10 NOTE — PROGRESS NOTES
Nette Aragon is a 30 y.o. female and presents with Complete Physical  .      Subjective:    Since last visit she has 2.5 month baby girl    a1c down to 5.3 July 2017  Platelets 267    Platelet count  She was diagnosed with autoimmune thrombocytopenia exacerbated with pregnancy. She got sick after delivery and noted to have low hemoglobin 9 and 179 but then platelets went down again to 115. He was diagnosed with autoimmune etiology. She was last seen mid April 2018. High blood pressure 136/80's  Home measurements    a1c 5.7-5.8  She reports she eat a lot of natural foods but are high in grains and carbs    thrombocytopenia  platelts were low  mvp elevated  Reviewed labs from [facility] with pt      Review of Systems  Constitutional: negative for fevers, chills, anorexia and weight loss  Eyes:   negative for visual disturbance and irritation  ENT:   negative for tinnitus,sore throat,nasal congestion,ear pains. hoarseness  Respiratory:  negative for cough, hemoptysis, dyspnea,wheezing  CV:   negative for chest pain, palpitations, lower extremity edema  GI:   negative for nausea, vomiting, diarrhea, abdominal pain,melena  Endo:               negative for polyuria,polydipsia,polyphagia,heat intolerance  Genitourinary: negative for frequency, dysuria and hematuria  Integument:  negative for rash and pruritus  Hematologic:  negative for easy bruising and gum/nose bleeding  Musculoskel: negative for myalgias, arthralgias, back pain, muscle weakness, joint pain  Neurological:  negative for headaches, dizziness, vertigo, memory problems and gait   Behavl/Psych: negative for feelings of anxiety, depression, mood changes    Past Medical History:   Diagnosis Date    Anemia 2015    taking iron    Complication of anesthesia     severe vomiting; requires antinausea medication first    Disease of blood and blood forming organ     Kidney disease     kidney stones    Psychiatric problem 2015    anxiety, panic attack after last delivery    Rh negative status during pregnancy 9/22/2013    rhogham given 6/22/2015    Thrombocytopenia ([facility] [address]     last labs plt were 118    Thyroid activity decreased 2015    tested multiple times, continued to improve through pregnancy, on no meds at this time     Past Surgical History:   Procedure Laterality Date    HX OTHER SURGICAL      wisdom, echo to check for murmur     HX WISDOM TEETH EXTRACTION       Social History     Social History    Marital status:      Spouse name: N/A    Number of children: N/A    Years of education: N/A     Social History Main Topics    Smoking status: Never Smoker    Smokeless tobacco: Never Used    Alcohol use No    Drug use: No    Sexual activity: Yes     Partners: Male     Birth control/ protection: None     Other Topics Concern    None     Social History Narrative    Mother of 7 children            21 months to 8year old    All healthy     Family History   Problem Relation Age of Onset    Cancer Father      melanoma and prostatate    Elevated Lipids Father     Hypertension Father     Diabetes Father     Cancer Maternal Grandmother      uterine    Hypertension Maternal Grandmother     Heart Disease Maternal Grandmother     Cancer Maternal Grandfather      brain cancer    Heart Disease Maternal Grandfather     Hypertension Maternal Grandfather     Cancer Paternal Grandmother      breast cancer    Diabetes Paternal Grandmother     Cancer Paternal Grandfather      throid    Diabetes Paternal Grandfather     Hypertension Paternal Grandfather     Stroke Paternal Grandfather     Anxiety Mother      diabetes borderline, POTS, fibromyalgia, IBS    Hypertension Brother      etohsim     Current Outpatient Prescriptions   Medication Sig Dispense Refill    PRENATAL VITS W-CA,FE,FA,<1MG, (PRENATAL VITAMIN PO) Take 1 Tab by mouth nightly.  ibuprofen (MOTRIN) 600 mg tablet Take 1 Tab by mouth every six (6) hours as needed for Pain.  30 Tab 0 Allergies   Allergen Reactions    Gluten Diarrhea    Other Medication Nausea and Vomiting     Patient states that she is intolerant of general anesthesia and requires that an anti-nausea medication be given first.        Objective:  Visit Vitals    /86 (BP 1 Location: Left arm, BP Patient Position: Sitting)    Pulse 78    Temp 98 °F (36.7 °C) (Oral)    Resp 16    Ht 5' 7\" (1.702 m)    Wt 145 lb (65.8 kg)    SpO2 97%    Breastfeeding Yes    BMI 22.71 kg/m2     Physical Exam:   General appearance - alert, well appearing, and in no distress appears to be mildly anxious  Mental status - alert, oriented to person, place, and time  EYE-OTTO, EOMI, corneas normal, no foreign bodies, visual acuity normal both eyes, no periorbital cellulitis  ENT-ENT exam normal, no neck nodes or sinus tenderness  Nose - normal and patent, no erythema, discharge or polyps  Mouth - mucous membranes moist, pharynx normal without lesions  Neck - supple, no significant adenopathy   Chest - clear to auscultation, no wheezes, rales or rhonchi, symmetric air entry   Heart - normal rate, regular rhythm, normal S1, S2, no murmurs, rubs, clicks or gallops   Abdomen - soft, nontender, nondistended, no masses or organomegaly  Lymph- no adenopathy palpable  Ext-peripheral pulses normal, no pedal edema, no clubbing or cyanosis  Skin-Warm and dry.  no hyperpigmentation, vitiligo, or suspicious lesions  Neuro -alert, oriented, normal speech, no focal findings or movement disorder noted  Neck-normal C-spine, no tenderness, full ROM without pain      Results for orders placed or performed during the hospital encounter of 02/22/18   CBC W/O DIFF   Result Value Ref Range    WBC 6.8 3.6 - 11.0 K/uL    RBC 3.76 (L) 3.80 - 5.20 M/uL    HGB 11.8 11.5 - 16.0 g/dL    HCT 34.6 (L) 35.0 - 47.0 %    MCV 92.0 80.0 - 99.0 FL    MCH 31.4 26.0 - 34.0 PG    MCHC 34.1 30.0 - 36.5 g/dL    RDW 14.6 (H) 11.5 - 14.5 %    PLATELET 963 (L) 048 - 400 K/uL    MPV 12.5 8.9 - 12.9 FL    NRBC 0.0 0  WBC    ABSOLUTE NRBC 0.00 0.00 - 0.01 K/uL   RH IMMUNE GLOBULIN EVAL-LAB ORDER   Result Value Ref Range    ABO/Rh(D) A NEGATIVE     Fetal screen NEG     WEAK D NEG     Unit number [id number]     Blood component type RH IMMUNE GLOBULIN     Unit division 00     Status of unit TRANSFUSED      Prevention    Cardiovascular profile  Family hx  Exercising:  Goes to dance company for 3 hours  Blood pressure:  Health healthy diet:  Diabetes:  Cholesterol:  Renal function:      Cancer risk profile  Mammogram  Lung  Colonoscopy small bout of IBS, GI Dr. Camelia Au   Skin nonhealing in 2 weeks, father with melanoma, Dr. Rodger Salinas abnormal bleeding/discharge/abd pain/pressure Dr. Donna Torrez      Thyroid sx  3/2017  Us with mousalli in January    Osteopenia prevention  Calcium 1000mg/day yes  Vitamin D 800iu/day yes    Mental health scale: 7/10  Situational stress  Depression  Anxiety  Sleep # of hours:  Energy Level:        Immunizations DEFERS immunizations in general   TDAP 2013  Pneumonia vaccine  Flu vaccine  Shingles vaccine  HPV    Diagnoses and all orders for this visit:    1. Well adult exam  Pt appears in very good physical and mental health. She is balancing her 7 children well. She will see Rinku Morton, dermatology, Camelia Au for her thrombocytopenia   -     TSH 3RD GENERATION  -     T4, FREE  -     THYROID ANTIBODY PANEL  -     CBC W/O DIFF  -     LIPID PANEL  -     METABOLIC PANEL, COMPREHENSIVE  -     VITAMIN D, 25 HYDROXY  -     VITAMIN B12 & FOLATE  -     SED RATE (ESR)  -     FERRITIN    2. Fatigue, unspecified type  -     TSH 3RD GENERATION  -     T4, FREE  -     THYROID ANTIBODY PANEL  -     CBC W/O DIFF  -     VITAMIN B12 & FOLATE  -     SED RATE (ESR)  -     FERRITIN    3. Vitamin D deficiency  -     VITAMIN D, 25 HYDROXY    Call pt re: labs            This note will not be viewable in MyChart.

## 2018-05-10 NOTE — MR AVS SNAPSHOT
[facility] 
 
 
 [address] [facility] [place] [address] [place] [address] 
[phone] Patient: Jaguar Ponce MRN: [id number] :1980 Visit Information Date & Time Provider Department Dept. Phone Encounter #  
 5/10/2018  1:00 PM Sherie Rapp MD [facility] [address] [id number] Upcoming Health Maintenance Date Due DTaP/Tdap/Td series (1 - Tdap) 2003 PAP AKA CERVICAL CYTOLOGY 2003 Influenza Age 5 to Adult 2018 Allergies as of 5/10/2018  Review Complete On: 5/10/2018 By: Sherie Rapp MD  
  
 Severity Noted Reaction Type Reactions Gluten  2018    Diarrhea Other Medication  2013   Intolerance Nausea and Vomiting Patient states that she is intolerant of general anesthesia and requires that an anti-nausea medication be given first.   
  
Current Immunizations  Never Reviewed Name Date Rho(D) Immune Globulin - IM 2018  5:47 PM, 2015  1:03 PM, 2013  9:21 AM  
  
 Not reviewed this visit You Were Diagnosed With   
  
 Codes Comments Well adult exam    -  Primary ICD-10-CM: Z00.00 ICD-9-CM: V70.0 Fatigue, unspecified type     ICD-10-CM: R53.83 ICD-9-CM: 780.79 Vitamin D deficiency     ICD-10-CM: E55.9 ICD-9-CM: 268.9 Nephrolithiasis     ICD-10-CM: N20.0 ICD-9-CM: 592.0 Vitals BP Pulse Temp Resp Height(growth percentile) Weight(growth percentile) 136/86 (BP 1 Location: Left arm, BP Patient Position: Sitting) 78 98 °F (36.7 °C) (Oral) 16 5' 7\" (1.702 m) 145 lb (65.8 kg) SpO2 Breastfeeding? BMI OB Status Smoking Status 97% Yes 22.71 kg/m2 Recent pregnancy Never Smoker Vitals History BMI and BSA Data Body Mass Index Body Surface Area  
 22.71 kg/m 2 1.76 m 2 Preferred Pharmacy Pharmacy Name Phone  [id number] [place], [address] [phone] Your Updated Medication List  
  
   
This list is accurate as of 5/10/18  1:52 PM.  Always use your most recent med list.  
  
  
  
  
 ibuprofen 600 mg tablet Commonly known as:  MOTRIN Take 1 Tab by mouth every six (6) hours as needed for Pain. PRENATAL VITAMIN PO Take 1 Tab by mouth nightly. We Performed the Following CBC W/O DIFF [01714 CPT(R)] FERRITIN [63069 CPT(R)] LIPID PANEL [48441 CPT(R)] METABOLIC PANEL, COMPREHENSIVE [99493 CPT(R)] SED RATE (ESR) D095468 CPT(R)] T4, FREE [id number] CPT(R)] THYROID ANTIBODY PANEL [02007 CPT(R)] TSH 3RD GENERATION [11062 CPT(R)] Comments:  
 Dr. Lizzy Westbrook. URINALYSIS W/ RFLX MICROSCOPIC [03616 CPT(R)] VITAMIN B12 & FOLATE [07758 CPT(R)] VITAMIN D, 25 HYDROXY [id number] CPT(R)] Introducing [facility] [place] [facility] SERVICES! Dear Barbie: 
Thank you for requesting a [employer] account. Our records indicate that you already have an active [employer] account. You can access your account anytime at [email or web address] Did you know that you can access your hospital and ER discharge instructions at any time in [employer]? You can also review all of your test results from your hospital stay or ER visit. Additional Information If you have questions, please visit the Frequently Asked Questions section of the [employer] website at [email or web address] Remember, [employer] is NOT to be used for urgent needs. For medical emergencies, dial 911. Now available from your iPhone and Android! Please provide this summary of care documentation to your next provider. Your primary care clinician is listed as Anthony Ferguson. If you have any questions after today's visit, please call [phone].

## 2018-05-11 LAB
25(OH)D3+25(OH)D2 SERPL-MCNC: 32.4 NG/ML (ref 30–100)
ALBUMIN SERPL-MCNC: 4.9 G/DL (ref 3.5–5.5)
ALBUMIN/GLOB SERPL: 2.1 {RATIO} (ref 1.2–2.2)
ALP SERPL-CCNC: 76 IU/L (ref 39–117)
ALT SERPL-CCNC: 20 IU/L (ref 0–32)
APPEARANCE UR: CLEAR
AST SERPL-CCNC: 19 IU/L (ref 0–40)
BACTERIA #/AREA URNS HPF: NORMAL /[HPF]
BILIRUB SERPL-MCNC: 0.4 MG/DL (ref 0–1.2)
BILIRUB UR QL STRIP: NEGATIVE
BUN SERPL-MCNC: 17 MG/DL (ref 6–20)
BUN/CREAT SERPL: 25 (ref 9–23)
CALCIUM SERPL-MCNC: 9.3 MG/DL (ref 8.7–10.2)
CASTS URNS QL MICRO: NORMAL /LPF
CHLORIDE SERPL-SCNC: 102 MMOL/L (ref 96–106)
CHOLEST SERPL-MCNC: 164 MG/DL (ref 100–199)
CO2 SERPL-SCNC: 25 MMOL/L (ref 18–29)
COLOR UR: YELLOW
CREAT SERPL-MCNC: 0.67 MG/DL (ref 0.57–1)
EPI CELLS #/AREA URNS HPF: NORMAL /HPF
ERYTHROCYTE [DISTWIDTH] IN BLOOD BY AUTOMATED COUNT: 13.3 % (ref 12.3–15.4)
ERYTHROCYTE [SEDIMENTATION RATE] IN BLOOD BY WESTERGREN METHOD: 8 MM/HR (ref 0–32)
FERRITIN SERPL-MCNC: 16 NG/ML (ref 15–150)
FOLATE SERPL-MCNC: 11.3 NG/ML
GFR SERPLBLD CREATININE-BSD FMLA CKD-EPI: 114 ML/MIN/1.73
GFR SERPLBLD CREATININE-BSD FMLA CKD-EPI: 132 ML/MIN/1.73
GLOBULIN SER CALC-MCNC: 2.3 G/DL (ref 1.5–4.5)
GLUCOSE SERPL-MCNC: 77 MG/DL (ref 65–99)
GLUCOSE UR QL: NEGATIVE
HCT VFR BLD AUTO: 39.2 % (ref 34–46.6)
HDLC SERPL-MCNC: 63 MG/DL
HGB BLD-MCNC: 12.6 G/DL (ref 11.1–15.9)
HGB UR QL STRIP: NEGATIVE
INTERPRETATION, 910389: NORMAL
KETONES UR QL STRIP: ABNORMAL
LDLC SERPL CALC-MCNC: 87 MG/DL (ref 0–99)
LEUKOCYTE ESTERASE UR QL STRIP: ABNORMAL
MCH RBC QN AUTO: 29 PG (ref 26.6–33)
MCHC RBC AUTO-ENTMCNC: 32.1 G/DL (ref 31.5–35.7)
MCV RBC AUTO: 90 FL (ref 79–97)
MICRO URNS: ABNORMAL
MUCOUS THREADS URNS QL MICRO: PRESENT
NITRITE UR QL STRIP: NEGATIVE
PH UR STRIP: 6 [PH] (ref 5–7.5)
PLATELET # BLD AUTO: 169 X10E3/UL (ref 150–379)
POTASSIUM SERPL-SCNC: 4.3 MMOL/L (ref 3.5–5.2)
PROT SERPL-MCNC: 7.2 G/DL (ref 6–8.5)
PROT UR QL STRIP: NEGATIVE
RBC # BLD AUTO: 4.35 X10E6/UL (ref 3.77–5.28)
RBC #/AREA URNS HPF: NORMAL /HPF
SODIUM SERPL-SCNC: 142 MMOL/L (ref 134–144)
SP GR UR: 1.02 (ref 1–1.03)
T4 FREE SERPL-MCNC: 1.24 NG/DL (ref 0.82–1.77)
THYROGLOB AB SERPL-ACNC: <1 IU/ML (ref 0–0.9)
THYROPEROXIDASE AB SERPL-ACNC: 11 IU/ML (ref 0–34)
TRIGL SERPL-MCNC: 71 MG/DL (ref 0–149)
TSH SERPL DL<=0.005 MIU/L-ACNC: 0.2 UIU/ML (ref 0.45–4.5)
UROBILINOGEN UR STRIP-MCNC: 0.2 MG/DL (ref 0.2–1)
VIT B12 SERPL-MCNC: 551 PG/ML (ref 232–1245)
VLDLC SERPL CALC-MCNC: 14 MG/DL (ref 5–40)
WBC # BLD AUTO: 6.2 X10E3/UL (ref 3.4–10.8)
WBC #/AREA URNS HPF: NORMAL /HPF

## 2018-05-14 ENCOUNTER — TELEPHONE (OUTPATIENT)
Dept: INTERNAL MEDICINE CLINIC | Age: 38
End: 2018-05-14

## 2018-05-14 NOTE — TELEPHONE ENCOUNTER
Patient had lab work done on Thursday 05/10/2018 and A1C was not done and she wanted that done. She wants to know about her other labs.  Her no is [phone]

## 2018-05-14 NOTE — TELEPHONE ENCOUNTER
Called pt, advised [facility] was called to add A1c, and we will mail result letter to patient, pt verbalized understanding.

## 2018-05-18 LAB
EST. AVERAGE GLUCOSE BLD GHB EST-MCNC: 111 MG/DL
HBA1C MFR BLD: 5.5 % (ref 4.8–5.6)
SPECIMEN STATUS REPORT, ROLRST: NORMAL

## 2018-06-19 ENCOUNTER — TELEPHONE (OUTPATIENT)
Dept: INTERNAL MEDICINE CLINIC | Age: 38
End: 2018-06-19

## 2018-06-19 ENCOUNTER — OFFICE VISIT (OUTPATIENT)
Dept: INTERNAL MEDICINE CLINIC | Age: 38
End: 2018-06-19

## 2018-06-19 VITALS
DIASTOLIC BLOOD PRESSURE: 81 MMHG | SYSTOLIC BLOOD PRESSURE: 126 MMHG | RESPIRATION RATE: 18 BRPM | HEIGHT: 67 IN | WEIGHT: 144 LBS | OXYGEN SATURATION: 97 % | HEART RATE: 105 BPM | TEMPERATURE: 98.6 F | BODY MASS INDEX: 22.6 KG/M2

## 2018-06-19 DIAGNOSIS — K58.9 IRRITABLE BOWEL SYNDROME WITHOUT DIARRHEA: Primary | ICD-10-CM

## 2018-06-19 DIAGNOSIS — R10.9 ABDOMINAL CRAMPING: ICD-10-CM

## 2018-06-19 RX ORDER — LOPERAMIDE HCL 2 MG
2 TABLET ORAL
Qty: 30 TAB | Refills: 2
Start: 2018-06-19 | End: 2018-06-29

## 2018-06-19 RX ORDER — ALUMINUM ZIRCONIUM OCTACHLOROHYDREX GLY 16 G/100G
1 GEL TOPICAL DAILY
Qty: 30 CAP | Refills: 2
Start: 2018-06-19 | End: 2019-05-16 | Stop reason: SDUPTHER

## 2018-06-19 NOTE — TELEPHONE ENCOUNTER
Spoke to pt, pt c/o of abd cramping for the past few days and feels like she's going to have diarrhea but no diarrhea. Pt also stated she saw some mucous in her stool and wanted to be evaluated. Offered pt an appt with Dr. [facility] at 10:30am, pt agreed.

## 2018-06-19 NOTE — TELEPHONE ENCOUNTER
Called pt regarding her sx, trying to offered pt an appt with another provider as PCP is out of the office, pt did not answer, left v/m asking pt to call back.

## 2018-06-19 NOTE — PROGRESS NOTES
HISTORY OF PRESENT ILLNESS  Chief Complaint   Patient presents with    GI Problem     C/o cramping and urgency to go. Reports light red blood mucus with BM     She is with her 2 month old baby daughter and 5 yo daughter    Presents with lower abdominal cramps and urgency for 5 day(s). No diarrhea, but feels she needs to go. She sits on the toilet and has a normal BM. Feels mild lower abdominal pains constantly. Woke up last night with severe cramps and pains. Has small BM. Scant bloody/mucousy rectal discharge twice. Mild nausea and chills last night  Associated symptoms include: stress in life due to baby  Reports hx of IBS in the past.    She is gluten free  Possible anal fissure 2 months ago. Took imodium last night and feel night and it helped. No sick contacts with GI issues. Colonoscopy 7 years ago Dr. Dorys De León. Has appt next week with a doctor there (female). Has 7 children    Review of Systems   All other systems reviewed and are negative, except as noted in HPI    Past Medical and Surgical History   has a past medical history of Anemia (2015); Complication of anesthesia; Disease of blood and blood forming organ; Irritable bowel syndrome without diarrhea; Kidney disease (2018); Psychiatric problem (2015); Rh negative status during pregnancy (9/22/2013); Thrombocytopenia ([address] and Thyroid activity decreased (2015). She also has no past medical history of Abnormal Pap smear; Abnormal Papanicolaou smear of cervix; Asthma; Asthma; Chlamydia; Diabetes ([address] Diabetes mellitus; Epilepsy ([address] Essential hypertension; Essential hypertension; Genital herpes, unspecified; Gestational hypertension; Gonorrhea; Heart abnormalities; Heart abnormality; Herpes gestationis; Herpes simplex without mention of complication; Human immunodeficiency virus (HIV) disease ([address] Infertility; Infertility, female; Liver disease; Non-nicotine vapor product user;  Other unknown and unspecified cause of morbidity or mortality; Phlebitis and thrombophlebitis of unspecified site; Pituitary disorder ([facility] Polycystic disease, ovaries; Postpartum depression; Rhesus isoimmunization affecting pregnancy; Sickle cell trait syndrome ([facility] [address] [facility] Sickle-cell disease, unspecified; Syphilis; Systemic lupus erythematosus ([facility] [address] [facility] Trauma; or Unspecified breast disorder. has a past surgical history that includes hx wisdom teeth extraction and hx other surgical.     reports that she has never smoked. She has never used smokeless tobacco. She reports that she does not drink alcohol or use illicit drugs. family history includes Anxiety in her mother; Cancer in her father, maternal grandfather, maternal grandmother, paternal grandfather, and paternal grandmother; Diabetes in her father, paternal grandfather, and paternal grandmother; Elevated Lipids in her father; Heart Disease in her maternal grandfather and maternal grandmother; Hypertension in her brother, father, maternal grandfather, maternal grandmother, and paternal grandfather; Stroke in her paternal grandfather. Physical Exam   Nursing note and vitals reviewed. Blood pressure 126/81, pulse (!) 105, temperature 98.6 °F (37 °C), temperature source Oral, resp. rate 18, height 5' 7\" (1.702 m), weight 144 lb (65.3 kg), SpO2 97 %, currently breastfeeding. Constitutional: In no distress. Eyes: Conjunctivae are normal.  HEENT:  No LAD or thyromegaly   Cardiovascular: Normal rate. regular rhythm. No murmurs  No edema  Pulmonary/Chest: Effort normal. clear to ausculation blaterally  Musculoskeletal:  no edema. Abd: soft, non-tender. Neurological: Alert and oriented. Grossly intact cranial nerves and motor function. Skin: No rash noted. Psychiatric: Normal mood and affect. Behavior is normal.     ASSESSMENT and PLAN  Diagnoses and all orders for this visit:    1. Irritable bowel syndrome without diarrhea  - s/s for 5 days.    This is likely mild gastroenteritis w IBS and anxiety. Need to consider crohns dz as next option. Will see GI next week and consider endoscopy. -     Try ALIGN 4 mg cap; Take 1 Cap by mouth daily.  -     Prn try loperamide (IMODIUM A-D) 2 mg tablet; Take 1 Tab by mouth four (4) times daily as needed for Diarrhea for up to 10 days. 2. Abdominal cramping  -     ALIGN 4 mg cap; Take 1 Cap by mouth daily. lab results and schedule of future lab studies reviewed with patient  reviewed medications and side effects in detail  Return to clinic for further evaluation if new symptoms develop or if current symptoms worsen or fail to resolve.

## 2018-06-19 NOTE — TELEPHONE ENCOUNTER
----- Message from Chela Arnold sent at 6/19/2018  8:25 AM EDT -----  Regarding: Lanre  Pt is returning a call from this morning. Pts number is [phone].

## 2019-02-20 ENCOUNTER — TELEPHONE (OUTPATIENT)
Dept: INTERNAL MEDICINE CLINIC | Age: 39
End: 2019-02-20

## 2019-02-20 NOTE — TELEPHONE ENCOUNTER
----- Message from Richar Rides sent at 2/20/2019  2:52 PM EST -----  Regarding: Dr. Violetta Childress  Pt is requesting a call back regarding her family has flu A and she is starting to have an burning sinsation in her chest for few days would like to speak with Dr evelyn rice. Best contact is [phone].

## 2019-02-20 NOTE — TELEPHONE ENCOUNTER
Pt scheduled for an appt tomorrow at 2:40pm. I advised pt is she has chest pain, or SOB, to go to the ER.   Pt voiced understanding and thanks

## 2019-02-21 ENCOUNTER — OFFICE VISIT (OUTPATIENT)
Dept: INTERNAL MEDICINE CLINIC | Age: 39
End: 2019-02-21

## 2019-02-21 VITALS
OXYGEN SATURATION: 97 % | SYSTOLIC BLOOD PRESSURE: 125 MMHG | HEIGHT: 67 IN | TEMPERATURE: 98.1 F | WEIGHT: 131 LBS | RESPIRATION RATE: 12 BRPM | DIASTOLIC BLOOD PRESSURE: 84 MMHG | BODY MASS INDEX: 20.56 KG/M2 | HEART RATE: 98 BPM

## 2019-02-21 DIAGNOSIS — R68.89 FLU-LIKE SYMPTOMS: Primary | ICD-10-CM

## 2019-02-21 DIAGNOSIS — R07.89 CHEST PRESSURE: ICD-10-CM

## 2019-02-21 LAB
FLUAV+FLUBV AG NOSE QL IA.RAPID: NEGATIVE POS/NEG
FLUAV+FLUBV AG NOSE QL IA.RAPID: NEGATIVE POS/NEG
VALID INTERNAL CONTROL?: YES

## 2019-02-21 NOTE — PATIENT INSTRUCTIONS
Influenza (Flu): Care Instructions Your Care Instructions Influenza (flu) is an infection in the lungs and breathing passages. It is caused by the influenza virus. There are different strains, or types, of the flu virus from year to year. Unlike the common cold, the flu comes on suddenly and the symptoms, such as a cough, congestion, fever, chills, fatigue, aches, and pains, are more severe. These symptoms may last up to 10 days. Although the flu can make you feel very sick, it usually doesn't cause serious health problems. Home treatment is usually all you need for flu symptoms. But your doctor may prescribe antiviral medicine to prevent other health problems, such as pneumonia, from developing. Older people and those who have a long-term health condition, such as lung disease, are most at risk for having pneumonia or other health problems. Follow-up care is a key part of your treatment and safety. Be sure to make and go to all appointments, and call your doctor if you are having problems. It's also a good idea to know your test results and keep a list of the medicines you take. How can you care for yourself at home? · Get plenty of rest. 
· Drink plenty of fluids, enough so that your urine is light yellow or clear like water. If you have kidney, heart, or liver disease and have to limit fluids, talk with your doctor before you increase the amount of fluids you drink. · Take an over-the-counter pain medicine if needed, such as acetaminophen (Tylenol), ibuprofen (Advil, Motrin), or naproxen (Aleve), to relieve fever, headache, and muscle aches. Read and follow all instructions on the label. No one younger than 20 should take aspirin. It has been linked to Reye syndrome, a serious illness. · Do not smoke. Smoking can make the flu worse. If you need help quitting, talk to your doctor about stop-smoking programs and medicines. These can increase your chances of quitting for good. · Breathe moist air from a hot shower or from a sink filled with hot water to help clear a stuffy nose. · Before you use cough and cold medicines, check the label. These medicines may not be safe for young children or for people with certain health problems. · If the skin around your nose and lips becomes sore, put some petroleum jelly on the area. · To ease coughing: ? Drink fluids to soothe a scratchy throat. ? Suck on cough drops or plain hard candy. ? Take an over-the-counter cough medicine that contains dextromethorphan to help you get some sleep. Read and follow all instructions on the label. ? Raise your head at night with an extra pillow. This may help you rest if coughing keeps you awake. · Take any prescribed medicine exactly as directed. Call your doctor if you think you are having a problem with your medicine. To avoid spreading the flu · Wash your hands regularly, and keep your hands away from your face. · Stay home from school, work, and other public places until you are feeling better and your fever has been gone for at least 24 hours. The fever needs to have gone away on its own without the help of medicine. · Ask people living with you to talk to their doctors about preventing the flu. They may get antiviral medicine to keep from getting the flu from you. · To prevent the flu in the future, get a flu vaccine every fall. Encourage people living with you to get the vaccine. · Cover your mouth when you cough or sneeze. When should you call for help? Call 911 anytime you think you may need emergency care.  For example, call if: 
  · You have severe trouble breathing.  
 Call your doctor now or seek immediate medical care if: 
  · You have new or worse trouble breathing.  
  · You seem to be getting much sicker.  
  · You feel very sleepy or confused.  
  · You have a new or higher fever.  
  · You get a new rash.  
 Watch closely for changes in your health, and be sure to contact your doctor if: 
  · You begin to get better and then get worse.  
  · You are not getting better after 1 week. Where can you learn more? Go to http://brea-amanda.info/. Enter G874 in the search box to learn more about \"Influenza (Flu): Care Instructions. \" Current as of: September 5, 2018 Content Version: 11.9 © 7746-9330 [employer]. Care instructions adapted under license by [employer] (which disclaims liability or warranty for this information). If you have questions about a medical condition or this instruction, always ask your healthcare professional. [place] [address] any warranty or liability for your use of this information.

## 2019-02-21 NOTE — PROGRESS NOTES
Chief Complaint Patient presents with  Flu Like Symptoms  Chest Pain \"burning\" sensation Fatigue Patient presents to evaluate for flu. She has 7 children at home and 5 of them have been diagnosed with the flu. Her  also has the flu. She reports that on Sunday 4 days ago she became extremely fatigued and had increase in her chest symptoms of difficulty getting a breath. She reports on Monday she felt close to her baseline but then Tuesday Wednesday felt tired again. Karri Morales Denies fever but feels she is normally lower. She reports she has had about 1 hour of sleep last night because her 15month-old daughter has the flu and she has only had one cup of water today. Symptoms are moderate. She has taken Advil 400 mg x1 with little relief. She notes that her chest burning symptoms radiates around to her back and lower abdomen when she exerts herself. Past Medical History:  
Diagnosis Date  Anemia 2015  
 taking iron  Complication of anesthesia   
 severe vomiting; requires antinausea medication first  
 Disease of blood and blood forming organ  Irritable bowel syndrome without diarrhea   
 colonoscopy 2011 Dr Raimundo Uriarte.  she is gluten-free  Kidney disease 2018  
 kidney stones  Psychiatric problem 2015  
 anxiety, panic attack after last delivery  Rh negative status during pregnancy 9/22/2013  
 rhogham given 6/22/2015  Thrombocytopenia ([address]   
 last labs plt were 118  Thyroid activity decreased 2015  
 tested multiple times, continued to improve through pregnancy, on no meds at this time Past Surgical History:  
Procedure Laterality Date  HX COLONOSCOPY Dr Raimundo Uriarte  HX OTHER SURGICAL    
 wisdom, echo to check for murmur  HX WISDOM TEETH EXTRACTION Social History Socioeconomic History  Marital status:  Spouse name: Not on file  Number of children: Not on file  Years of education: Not on file  Highest education level: Not on file Tobacco Use  Smoking status: Never Smoker  Smokeless tobacco: Never Used Substance and Sexual Activity  Alcohol use: No  
 Drug use: No  
 Sexual activity: Yes  
  Partners: Male Birth control/protection: None Social History Narrative Mother of 7 children 11, 9, 7, 5 (adopted) , 4, 2, 2 months She adopted the 11year old All healthy Family History Problem Relation Age of Onset  Cancer Father   
     melanoma and prostatate  Elevated Lipids Father  Hypertension Father  Diabetes Father  Cancer Maternal Grandmother   
     uterine  Hypertension Maternal Grandmother  Heart Disease Maternal Grandmother  Cancer Maternal Grandfather   
     brain cancer  Heart Disease Maternal Grandfather  Hypertension Maternal Grandfather  Cancer Paternal Grandmother   
     breast cancer  Diabetes Paternal Grandmother  Cancer Paternal Grandfather   
     throid  Diabetes Paternal Grandfather  Hypertension Paternal Grandfather  Stroke Paternal Grandfather  Anxiety Mother   
     diabetes borderline, POTS, fibromyalgia, IBS  Hypertension Brother   
     etohsim Current Outpatient Medications Medication Sig Dispense Refill  ergocalciferol, vitamin D2, (VITAMIN D2 PO) Take  by mouth.  ALIGN 4 mg cap Take 1 Cap by mouth daily. 30 Cap 2  ibuprofen (MOTRIN) 600 mg tablet Take 1 Tab by mouth every six (6) hours as needed for Pain. 30 Tab 0  
 PRENATAL VITS W-CA,FE,FA,<1MG, (PRENATAL VITAMIN PO) Take 1 Tab by mouth nightly. Allergies Allergen Reactions  Gluten Diarrhea  Other Medication Nausea and Vomiting Patient states that she is intolerant of general anesthesia and requires that an anti-nausea medication be given first.   
 
 
Review of Systems - General ROS: positive for  - fatigue, malaise and sleep disturbance 
negative for - chills or fever Cardiovascular ROS: positive for -chest burning Respiratory ROS: positive for - cough, shortness of breath and denies wheezing Visit Vitals /84 (BP 1 Location: Left arm, BP Patient Position: Sitting) Pulse 98 Temp 98.1 °F (36.7 °C) (Oral) Resp 12 Ht 5' 7\" (1.702 m) Wt 131 lb (59.4 kg) SpO2 97% BMI 20.52 kg/m² General Appearance:  Well developed, well nourished,alert and oriented x 3, and individual in no acute distress. Ears/Nose/Mouth/Throat:   Hearing grossly normal. 
  
    Neck: Supple, no lad, no bruits Chest:   Lungs clear to auscultation bilaterally. Cardiovascular:  Regular rate and rhythm, S1, S2 normal, no murmur. Abdomen:   Soft, non-tender, bowel sounds are active. Extremities: No edema bilaterally. Skin: Warm and dry, no suspicious lesions Diagnoses and all orders for this visit: 1. Flu-like symptoms Patient is flu negative but with increased fatigue. She is dehydrated as noted with her tachycardia and reports also poor p.o. intake due to increased activity at home. She also is not sleeping well due to her care needs at home. Discussed with patient to take Advil 400 mg every 4-6 hours with food and water for 3 days, get sleep and hydration. She will touch base with me if she is still not feeling well despite conservative care. Her EKG is consistent with tachycardia likely due to dehydration. She also reports dark colored urine but no UTI symptoms. Defers Tamiflu for prophylaxis -     AMB POC GIANFRANCO INFLUENZA A/B TEST 2. Chest pressure Tachycardia noted on EKG she will touch base with me if she is not feeling better but will try to stay hydrated. -     AMB POC EKG ROUTINE W/ 12 LEADS, INTER & REP I spent 25 minutes with this patient greater than 50% of the time was spent in management and counseling with regards to her flulike symptoms, dehydration, poor sleep and resulting tachycardia.   Will do conservative care.  Discussed side effects of Tamiflu in terms of prophylaxis and she defers due to GI side effects. Patient will contact me if she is still not doing well despite conservative measures.

## 2019-05-16 ENCOUNTER — OFFICE VISIT (OUTPATIENT)
Dept: INTERNAL MEDICINE CLINIC | Age: 39
End: 2019-05-16

## 2019-05-16 VITALS
HEIGHT: 67 IN | DIASTOLIC BLOOD PRESSURE: 72 MMHG | SYSTOLIC BLOOD PRESSURE: 118 MMHG | OXYGEN SATURATION: 99 % | TEMPERATURE: 97.9 F | RESPIRATION RATE: 18 BRPM | WEIGHT: 130.13 LBS | BODY MASS INDEX: 20.43 KG/M2 | HEART RATE: 89 BPM

## 2019-05-16 DIAGNOSIS — Z00.00 WELL ADULT EXAM: Primary | ICD-10-CM

## 2019-05-16 DIAGNOSIS — Z78.9 VEGAN DIET: ICD-10-CM

## 2019-05-16 NOTE — PROGRESS NOTES
Chief Complaint Patient presents with  Complete Physical  
 
 
Patient presents for her annual.  She is here with her 3year-old daughter. Patient reports overall good health. She does have some issues of bruising but she is being followed by her hematologist, Dr. Sherie Mortensen. She also thinks she has fibromyalgia. She has some tenderness under both of her armpit areas. She does not feel any masses there. She reports she also has wax and has been cleared by ENT in the past. 
 
 
 
Fatigue 8 hours intermittent, feels rested but not well rested. Not tired during the day. Patient has 7 children ages 12-3 yo. She thinks she may have fibromyalgia. Armpit pain Patient reports she shaves under her arms every day. She has some sensitivity under her arms especially when she is moving and with palpation. She denies any lumps or bumps. She is still breast-feeding. Past Medical History:  
Diagnosis Date  Anemia 2015  
 taking iron  Complication of anesthesia   
 severe vomiting; requires antinausea medication first  
 Disease of blood and blood forming organ  Irritable bowel syndrome without diarrhea   
 colonoscopy 2011 Dr Sherie Mortensen.  she is gluten-free  Kidney disease 2018  
 kidney stones  Psychiatric problem 2015  
 anxiety, panic attack after last delivery  Rh negative status during pregnancy 9/22/2013  
 rhogham given 6/22/2015  Thrombocytopenia ([facility] [address]   
 last labs plt were 118  Thyroid activity decreased 2015  
 tested multiple times, continued to improve through pregnancy, on no meds at this time Past Surgical History:  
Procedure Laterality Date  HX COLONOSCOPY Dr Sherie Mortensen  HX OTHER SURGICAL    
 wisdom, echo to check for murmur  HX WISDOM TEETH EXTRACTION Social History Socioeconomic History  Marital status:  Spouse name: Not on file  Number of children: Not on file  Years of education: Not on file  Highest education level: Not on file Tobacco Use  Smoking status: Never Smoker  Smokeless tobacco: Never Used Substance and Sexual Activity  Alcohol use: No  
 Drug use: No  
 Sexual activity: Yes  
  Partners: Male Birth control/protection: None Social History Narrative Mother of 7 children 11, 9, 7, 5 (adopted) , 4, 2, 2 months She adopted the 11year old All healthy Family History Problem Relation Age of Onset  Cancer Father   
     melanoma and prostatate  Elevated Lipids Father  Hypertension Father  Diabetes Father  Cancer Maternal Grandmother   
     uterine  Hypertension Maternal Grandmother  Heart Disease Maternal Grandmother  Cancer Maternal Grandfather   
     brain cancer  Heart Disease Maternal Grandfather  Hypertension Maternal Grandfather  Cancer Paternal Grandmother   
     breast cancer  Diabetes Paternal Grandmother  Cancer Paternal Grandfather   
     throid  Diabetes Paternal Grandfather  Hypertension Paternal Grandfather  Stroke Paternal Grandfather  Anxiety Mother   
     diabetes borderline, POTS, fibromyalgia, IBS  Hypertension Brother   
     etohsim Current Outpatient Medications Medication Sig Dispense Refill  B.infantis-B.ani-B.long-B.bifi (PROBIOTIC 4X) 10-15 mg TbEC Take  by mouth.  ergocalciferol, vitamin D2, (VITAMIN D2 PO) Take  by mouth. Allergies Allergen Reactions  Gluten Diarrhea  Other Medication Nausea and Vomiting Patient states that she is intolerant of general anesthesia and requires that an anti-nausea medication be given first.   
 
 
Review of Systems - General ROS: positive for  - fatigue, malaise and sleep disturbance 
negative for - chills or fever Cardiovascular ROS: positive for -chest burning Respiratory ROS: positive for - cough, shortness of breath and denies wheezing Visit Vitals /72 (BP 1 Location: Left arm, BP Patient Position: Sitting) Pulse 89 Temp 97.9 °F (36.6 °C) (Oral) Resp 18 Ht 5' 7\" (1.702 m) Wt 130 lb 2 oz (59 kg) SpO2 99% BMI 20.38 kg/m² General Appearance:  Well developed, well nourished,alert and oriented x 3, and individual in no acute distress. Ears/Nose/Mouth/Throat:   Hearing grossly normal. 
Breasts: Bilaterally dense, no masses or cysts in axilla bilaterally no supraclavicular lymph nodes Neck: Supple, no lad, no bruits Chest:   Lungs clear to auscultation bilaterally. Cardiovascular:  Regular rate and rhythm, S1, S2 normal, no murmur. Abdomen:   Soft, non-tender, bowel sounds are active. Extremities: No edema bilaterally. 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 
Health healthy diet: 
Diabetes: 
Cholesterol: 
Renal function: 
 
  
Cancer risk profile Mammogram armpit issue Lung no wheezing Colonoscopy small bout of IBS, GI Dr. Karin Meng, Female GI Skin nonhealing in 2 weeks, father with melanoma, Dr. Allyssa Young Gyn abnormal bleeding/discharge/abd pain/pressure Dr. Katya Meng hematologist 
 
 
Thyroid sx 
3/2017 Us with mousalli in January Osteopenia prevention Calcium 1000mg/day yes Vitamin D 800iu/day yes Mental health scale: 7/10 
2 special needs kids Situational stress Depression Anxiety Sleep # of hours: 
Energy Level:     
 
Immunizations DEFERS immunizations in general  
TDAP 2013 Pneumonia vaccine Flu vaccine Shingles vaccine HPV Diagnoses and all orders for this visit: 
 
1. Well adult exam 
Patient appears in very good health. She does have some axillary tenderness. Her exam was normal.  We will continue to monitor. 
-     LIPID PANEL 
-     METABOLIC PANEL, COMPREHENSIVE 
-     HEMOGLOBIN A1C WITH EAG 2. Vegan diet -     VITAMIN B12 Labs from Dr. Grant Campos and will give patient a call with regards to her current labs. Follow-up in 1 year or earlier if needed

## 2019-05-17 LAB
ALBUMIN SERPL-MCNC: 4.9 G/DL (ref 3.5–5.5)
ALBUMIN/GLOB SERPL: 2 {RATIO} (ref 1.2–2.2)
ALP SERPL-CCNC: 107 IU/L (ref 39–117)
ALT SERPL-CCNC: 16 IU/L (ref 0–32)
AST SERPL-CCNC: 19 IU/L (ref 0–40)
BILIRUB SERPL-MCNC: 0.6 MG/DL (ref 0–1.2)
BUN SERPL-MCNC: 9 MG/DL (ref 6–20)
BUN/CREAT SERPL: 16 (ref 9–23)
CALCIUM SERPL-MCNC: 9.5 MG/DL (ref 8.7–10.2)
CHLORIDE SERPL-SCNC: 101 MMOL/L (ref 96–106)
CHOLEST SERPL-MCNC: 155 MG/DL (ref 100–199)
CO2 SERPL-SCNC: 26 MMOL/L (ref 20–29)
CREAT SERPL-MCNC: 0.58 MG/DL (ref 0.57–1)
EST. AVERAGE GLUCOSE BLD GHB EST-MCNC: 105 MG/DL
GLOBULIN SER CALC-MCNC: 2.5 G/DL (ref 1.5–4.5)
GLUCOSE SERPL-MCNC: 76 MG/DL (ref 65–99)
HBA1C MFR BLD: 5.3 % (ref 4.8–5.6)
HDLC SERPL-MCNC: 68 MG/DL
INTERPRETATION, 910389: NORMAL
LDLC SERPL CALC-MCNC: 74 MG/DL (ref 0–99)
POTASSIUM SERPL-SCNC: 4.4 MMOL/L (ref 3.5–5.2)
PROT SERPL-MCNC: 7.4 G/DL (ref 6–8.5)
SODIUM SERPL-SCNC: 142 MMOL/L (ref 134–144)
TRIGL SERPL-MCNC: 66 MG/DL (ref 0–149)
VIT B12 SERPL-MCNC: 308 PG/ML (ref 232–1245)
VLDLC SERPL CALC-MCNC: 13 MG/DL (ref 5–40)

## 2019-05-19 NOTE — PROGRESS NOTES
Lanre England, can you pull patients last labs and note from Dr. Abraham Menjivar from hematology oncology? I would like to get these before I give this patient a call back.

## 2019-06-16 DIAGNOSIS — E53.8 B12 DEFICIENCY: Primary | ICD-10-CM

## 2019-06-17 NOTE — PROGRESS NOTES
Still have not received records. Called Dr Nikolay Carr office again and requested the last note and labs.

## 2019-06-17 NOTE — PROGRESS NOTES
Called Dr Freda Farley office, the pt was last seen April 2019. She is faxed the last office note and labs for our review.

## 2019-07-06 ENCOUNTER — APPOINTMENT (OUTPATIENT)
Dept: ULTRASOUND IMAGING | Age: 39
End: 2019-07-06
Attending: EMERGENCY MEDICINE
Payer: COMMERCIAL

## 2019-07-06 ENCOUNTER — HOSPITAL ENCOUNTER (OUTPATIENT)
Age: 39
Setting detail: OBSERVATION
Discharge: HOME | End: 2019-07-08
Attending: EMERGENCY MEDICINE | Admitting: HOSPITALIST
Payer: COMMERCIAL

## 2019-07-06 DIAGNOSIS — N61.0 MASTITIS: Primary | ICD-10-CM

## 2019-07-06 LAB
ALBUMIN SERPL-MCNC: 4.2 G/DL (ref 3.5–5)
ALBUMIN/GLOB SERPL: 1.2 {RATIO} (ref 1.1–2.2)
ALP SERPL-CCNC: 100 U/L (ref 45–117)
ALT SERPL-CCNC: 23 U/L (ref 12–78)
ANION GAP SERPL CALC-SCNC: 16 MMOL/L (ref 5–15)
AST SERPL-CCNC: 14 U/L (ref 15–37)
BASOPHILS # BLD: 0 K/UL (ref 0–0.1)
BASOPHILS NFR BLD: 0 % (ref 0–1)
BILIRUB SERPL-MCNC: 1 MG/DL (ref 0.2–1)
BUN SERPL-MCNC: 13 MG/DL (ref 6–20)
BUN/CREAT SERPL: 17 (ref 12–20)
CALCIUM SERPL-MCNC: 9 MG/DL (ref 8.5–10.1)
CHLORIDE SERPL-SCNC: 103 MMOL/L (ref 97–108)
CO2 SERPL-SCNC: 25 MMOL/L (ref 21–32)
COMMENT, HOLDF: NORMAL
CREAT SERPL-MCNC: 0.77 MG/DL (ref 0.55–1.02)
DIFFERENTIAL METHOD BLD: ABNORMAL
EOSINOPHIL # BLD: 0 K/UL (ref 0–0.4)
EOSINOPHIL NFR BLD: 0 % (ref 0–7)
ERYTHROCYTE [DISTWIDTH] IN BLOOD BY AUTOMATED COUNT: 13.2 % (ref 11.5–14.5)
GLOBULIN SER CALC-MCNC: 3.4 G/DL (ref 2–4)
GLUCOSE SERPL-MCNC: 120 MG/DL (ref 65–100)
HCT VFR BLD AUTO: 40.1 % (ref 35–47)
HGB BLD-MCNC: 13.3 G/DL (ref 11.5–16)
IMM GRANULOCYTES # BLD AUTO: 0.2 K/UL (ref 0–0.04)
IMM GRANULOCYTES NFR BLD AUTO: 1 % (ref 0–0.5)
LACTATE BLD-SCNC: 1.07 MMOL/L (ref 0.4–2)
LYMPHOCYTES # BLD: 0.5 K/UL (ref 0.8–3.5)
LYMPHOCYTES NFR BLD: 3 % (ref 12–49)
MCH RBC QN AUTO: 29.1 PG (ref 26–34)
MCHC RBC AUTO-ENTMCNC: 33.2 G/DL (ref 30–36.5)
MCV RBC AUTO: 87.7 FL (ref 80–99)
MONOCYTES # BLD: 0.8 K/UL (ref 0–1)
MONOCYTES NFR BLD: 5 % (ref 5–13)
NEUTS SEG # BLD: 13.8 K/UL (ref 1.8–8)
NEUTS SEG NFR BLD: 91 % (ref 32–75)
NRBC # BLD: 0 K/UL (ref 0–0.01)
NRBC BLD-RTO: 0 PER 100 WBC
PLATELET # BLD AUTO: 122 K/UL (ref 150–400)
PMV BLD AUTO: 13 FL (ref 8.9–12.9)
POTASSIUM SERPL-SCNC: 3.6 MMOL/L (ref 3.5–5.1)
PROT SERPL-MCNC: 7.6 G/DL (ref 6.4–8.2)
RBC # BLD AUTO: 4.57 M/UL (ref 3.8–5.2)
RBC MORPH BLD: ABNORMAL
SAMPLES BEING HELD,HOLD: NORMAL
SODIUM SERPL-SCNC: 144 MMOL/L (ref 136–145)
WBC # BLD AUTO: 15.3 K/UL (ref 3.6–11)

## 2019-07-06 PROCEDURE — 74011250636 HC RX REV CODE- 250/636: Performed by: EMERGENCY MEDICINE

## 2019-07-06 PROCEDURE — 96376 TX/PRO/DX INJ SAME DRUG ADON: CPT

## 2019-07-06 PROCEDURE — 87040 BLOOD CULTURE FOR BACTERIA: CPT

## 2019-07-06 PROCEDURE — 36415 COLL VENOUS BLD VENIPUNCTURE: CPT

## 2019-07-06 PROCEDURE — 99218 HC RM OBSERVATION: CPT

## 2019-07-06 PROCEDURE — 85025 COMPLETE CBC W/AUTO DIFF WBC: CPT

## 2019-07-06 PROCEDURE — 74011000258 HC RX REV CODE- 258: Performed by: HOSPITALIST

## 2019-07-06 PROCEDURE — 76604 US EXAM CHEST: CPT

## 2019-07-06 PROCEDURE — 96375 TX/PRO/DX INJ NEW DRUG ADDON: CPT

## 2019-07-06 PROCEDURE — 80053 COMPREHEN METABOLIC PANEL: CPT

## 2019-07-06 PROCEDURE — 83605 ASSAY OF LACTIC ACID: CPT

## 2019-07-06 PROCEDURE — 96366 THER/PROPH/DIAG IV INF ADDON: CPT

## 2019-07-06 PROCEDURE — 65410000002 HC RM PRIVATE OB

## 2019-07-06 PROCEDURE — 74011250636 HC RX REV CODE- 250/636: Performed by: HOSPITALIST

## 2019-07-06 PROCEDURE — 96365 THER/PROPH/DIAG IV INF INIT: CPT

## 2019-07-06 PROCEDURE — 96361 HYDRATE IV INFUSION ADD-ON: CPT

## 2019-07-06 PROCEDURE — 74011000258 HC RX REV CODE- 258: Performed by: EMERGENCY MEDICINE

## 2019-07-06 PROCEDURE — 74011250637 HC RX REV CODE- 250/637: Performed by: HOSPITALIST

## 2019-07-06 PROCEDURE — 96367 TX/PROPH/DG ADDL SEQ IV INF: CPT

## 2019-07-06 PROCEDURE — 99284 EMERGENCY DEPT VISIT MOD MDM: CPT

## 2019-07-06 RX ORDER — ONDANSETRON 2 MG/ML
4 INJECTION INTRAMUSCULAR; INTRAVENOUS
Status: DISCONTINUED | OUTPATIENT
Start: 2019-07-06 | End: 2019-07-08 | Stop reason: HOSPADM

## 2019-07-06 RX ORDER — ONDANSETRON 2 MG/ML
4 INJECTION INTRAMUSCULAR; INTRAVENOUS
Status: COMPLETED | OUTPATIENT
Start: 2019-07-06 | End: 2019-07-06

## 2019-07-06 RX ORDER — ACETAMINOPHEN 325 MG/1
650 TABLET ORAL
Status: DISCONTINUED | OUTPATIENT
Start: 2019-07-06 | End: 2019-07-08 | Stop reason: HOSPADM

## 2019-07-06 RX ORDER — SODIUM CHLORIDE 0.9 % (FLUSH) 0.9 %
5-40 SYRINGE (ML) INJECTION AS NEEDED
Status: DISCONTINUED | OUTPATIENT
Start: 2019-07-06 | End: 2019-07-08 | Stop reason: HOSPADM

## 2019-07-06 RX ORDER — SODIUM CHLORIDE 0.9 % (FLUSH) 0.9 %
5-40 SYRINGE (ML) INJECTION EVERY 8 HOURS
Status: DISCONTINUED | OUTPATIENT
Start: 2019-07-06 | End: 2019-07-08 | Stop reason: HOSPADM

## 2019-07-06 RX ORDER — NALOXONE HYDROCHLORIDE 0.4 MG/ML
0.4 INJECTION, SOLUTION INTRAMUSCULAR; INTRAVENOUS; SUBCUTANEOUS AS NEEDED
Status: DISCONTINUED | OUTPATIENT
Start: 2019-07-06 | End: 2019-07-08 | Stop reason: HOSPADM

## 2019-07-06 RX ORDER — SODIUM CHLORIDE 9 MG/ML
100 INJECTION, SOLUTION INTRAVENOUS CONTINUOUS
Status: DISPENSED | OUTPATIENT
Start: 2019-07-06 | End: 2019-07-07

## 2019-07-06 RX ADMIN — CEFTRIAXONE 1 G: 1 INJECTION, POWDER, FOR SOLUTION INTRAMUSCULAR; INTRAVENOUS at 10:57

## 2019-07-06 RX ADMIN — CEFAZOLIN 1 G: 1 INJECTION, POWDER, FOR SOLUTION INTRAMUSCULAR; INTRAVENOUS at 22:10

## 2019-07-06 RX ADMIN — SODIUM CHLORIDE 1000 ML: 900 INJECTION, SOLUTION INTRAVENOUS at 12:51

## 2019-07-06 RX ADMIN — ONDANSETRON 4 MG: 2 INJECTION INTRAMUSCULAR; INTRAVENOUS at 20:19

## 2019-07-06 RX ADMIN — ACETAMINOPHEN 650 MG: 325 TABLET ORAL at 20:33

## 2019-07-06 RX ADMIN — SODIUM CHLORIDE 1000 ML: 900 INJECTION, SOLUTION INTRAVENOUS at 10:50

## 2019-07-06 RX ADMIN — ONDANSETRON 4 MG: 2 INJECTION INTRAMUSCULAR; INTRAVENOUS at 15:10

## 2019-07-06 RX ADMIN — CEFAZOLIN 1 G: 1 INJECTION, POWDER, FOR SOLUTION INTRAMUSCULAR; INTRAVENOUS at 14:12

## 2019-07-06 RX ADMIN — ONDANSETRON 4 MG: 2 INJECTION INTRAMUSCULAR; INTRAVENOUS at 10:56

## 2019-07-06 NOTE — ED NOTES
Pt taking sips of ginger ale - ok to drink per Dr. Keisha Huitron. Pt states she is very cold and given another blanket. Temp rechecked and 98.9 orally.

## 2019-07-06 NOTE — ED NOTES
Report given to [facility] and care of patient transferred. Patient transferred out of department via stretcher without evidence of acute distress.

## 2019-07-06 NOTE — ED PROVIDER NOTES
Pt. Presents to the ER with complaints of right breast pain. Pt. Has a history of mastitis in the past.  Pt. Says that she felt very tired two days ago. Pt. Began to have redness and pain at her right breast yesterday. Her OB called in dicloxacillin yesterday. Pt. Says that she initially felt better. However, last night and today she began to feel much worse. Pt. Began to have nausea and vomiting last night.  +subjective fevers. No changes with her urine or bowel movements. Pt. Denies other complaints.              Past Medical History:   Diagnosis Date    Anemia 2015    taking iron    Complication of anesthesia     severe vomiting; requires antinausea medication first    Disease of blood and blood forming organ     Irritable bowel syndrome without diarrhea     colonoscopy 2011 Dr Stefan Almonte.  she is gluten-free    Kidney disease 2018    kidney stones    Psychiatric problem 2015    anxiety, panic attack after last delivery    Rh negative status during pregnancy 9/22/2013    rhogham given 6/22/2015    Thrombocytopenia ([address]     last labs plt were 118    Thyroid activity decreased 2015    tested multiple times, continued to improve through pregnancy, on no meds at this time       Past Surgical History:   Procedure Laterality Date    HX COLONOSCOPY      Dr Haas Orn HX OTHER SURGICAL      wisdom, echo to check for murmur     HX WISDOM TEETH EXTRACTION           Family History:   Problem Relation Age of Onset    Cancer Father         melanoma and prostatate    Elevated Lipids Father     Hypertension Father     Diabetes Father     Cancer Maternal Grandmother         uterine    Hypertension Maternal Grandmother     Heart Disease Maternal Grandmother     Cancer Maternal Grandfather         brain cancer    Heart Disease Maternal Grandfather     Hypertension Maternal Grandfather     Cancer Paternal Grandmother         breast cancer    Diabetes Paternal Grandmother     Cancer Paternal Grandfather         throid    Diabetes Paternal Grandfather     Hypertension Paternal Grandfather     Stroke Paternal Grandfather     Anxiety Mother         diabetes borderline, POTS, fibromyalgia, IBS    Hypertension Brother         etohsim       Social History     Socioeconomic History    Marital status:      Spouse name: Not on file    Number of children: Not on file    Years of education: Not on file    Highest education level: Not on file   Occupational History    Not on file   Social Needs    Financial resource strain: Not on file    Food insecurity:     Worry: Not on file     Inability: Not on file    Transportation needs:     Medical: Not on file     Non-medical: Not on file   Tobacco Use    Smoking status: Never Smoker    Smokeless tobacco: Never Used   Substance and Sexual Activity    Alcohol use: No    Drug use: No    Sexual activity: Yes     Partners: Male     Birth control/protection: None   Lifestyle    Physical activity:     Days per week: Not on file     Minutes per session: Not on file    Stress: Not on file   Relationships    Social connections:     Talks on phone: Not on file     Gets together: Not on file     Attends Anabaptism service: Not on file     Active member of club or organization: Not on file     Attends meetings of clubs or organizations: Not on file     Relationship status: Not on file    Intimate partner violence:     Fear of current or ex partner: Not on file     Emotionally abused: Not on file     Physically abused: Not on file     Forced sexual activity: Not on file   Other Topics Concern    Not on file   Social History Narrative    Mother of 7 children            15, 8, 8 6,, 5 (adopted) , 5, 2, 2 months     She adopted the 11year old    All healthy         ALLERGIES: Gluten and Other medication    Review of Systems   Constitutional: Negative for chills and fever. HENT: Negative for rhinorrhea and sore throat.     Respiratory: Negative for cough and shortness of breath. Cardiovascular: Negative for chest pain. Gastrointestinal: Negative for abdominal pain, diarrhea, nausea and vomiting. Genitourinary: Negative for dysuria and urgency. Musculoskeletal: Negative for arthralgias and back pain. Skin: Negative for rash. Neurological: Negative for dizziness, weakness and light-headedness. Vitals:    07/06/19 1023   BP: 119/75   Pulse: (!) 115   Resp: 18   Temp: 99.3 °F (37.4 °C)   SpO2: 99%   Weight: 58.8 kg (129 lb 10.1 oz)            Physical Exam     Vital signs reviewed. Nursing notes reviewed. Const:  No acute distress, well developed, well nourished  Head:  Atraumatic, normocephalic  Eyes:  PERRL, conjunctiva normal, no scleral icterus  Neck:  Supple, trachea midline  Cardiovascular:  RRR, no murmurs, no gallops, no rubs  Resp:  No resp distress, no increased work of breathing, no wheezes, no rhonchi, no rales,  Abd:  Soft, non-tender, non-distended, no rebound, no guarding, no CVA tenderness  MSK:  No pedal edema, normal ROM  Neuro:  Alert and oriented x3, no cranial nerve defect  Skin:  Right breast warmth and redness, erythema is primarily inferior to the right nipple  Psych: normal mood and affect, behavior is normal, judgement and thought content is normal          MDM  Number of Diagnoses or Management Options  Mastitis:      Amount and/or Complexity of Data Reviewed  Clinical lab tests: ordered and reviewed  Tests in the radiology section of CPT®: ordered and reviewed  Review and summarize past medical records: yes    Patient Progress  Patient progress: stable          Pt. Presents to the ER with complaints of right breast pain. Pt. Found to have mastitis. Pt. Presented with elevated HR and elevated WBC. Her HR has improved after IVF. Pt. Has been having difficulty tolerating PO. I have started her on abx. Pt. To be admitted by the hospitalist for further care.       Procedures

## 2019-07-06 NOTE — H&P
History & Physical    Primary Care Provider: Che Myers MD  Source of Information: Patient     History of Presenting Illness:   Vincenzo Vargas is a 41 y.o. female who presents with right breast pain and redness     Pt. Presents to the ER with complaints of right breast pain and redenss. Pt. Has a history of mastitis in the past. She is still breast feeding her 14 months old baby. Pt. Says that she felt very tired two days ago. Pt. Began to have redness and pain at her right breast yesterday. Her OB called in dicloxacillin yesterday. Pt. Says that she initially felt better. However, last night and today she began to feel much worse. Pt. Began to have nausea and vomiting last night.  +subjective fevers. No changes with her urine or bowel movements. Pt. Denies other complaints. Review of Systems:  General: HPI, no changes of weight and sleep  HEENT: no headache, no vision changes, no nose discharge, no hearing changes   RES: no wheezing, no cough, no sob  CVS: no cp, no palpitation. Muscular: no joint swelling, no muscle pain, no leg swelling  Skin: hpi.    GI: no vomiting, no diarrhea  : no dysuria, no hematuria  Hemo: no gum bleeding, no petechial   Neuro: no sensation changes, no focal weakness   Endo: no polydipsia   Psych: denied depression     Past Medical History:   Diagnosis Date    Anemia 2015    taking iron    Complication of anesthesia     severe vomiting; requires antinausea medication first    Disease of blood and blood forming organ     Irritable bowel syndrome without diarrhea     colonoscopy 2011 Dr Onelia Chandler.  she is gluten-free    Kidney disease 2018    kidney stones    Psychiatric problem 2015    anxiety, panic attack after last delivery    Rh negative status during pregnancy 9/22/2013    rhogham given 6/22/2015    Thrombocytopenia ([facility] [address]     last labs plt were 118    Thyroid activity decreased 2015    tested multiple times, continued to improve through pregnancy, on no meds at this time      Past Surgical History:   Procedure Laterality Date    HX COLONOSCOPY      Dr Ap Welch HX OTHER SURGICAL      wisdom, echo to check for murmur     HX WISDOM TEETH EXTRACTION       Prior to Admission medications    Medication Sig Start Date End Date Taking? Authorizing Provider   B.infantis-B.ani-B.long-B.bifi (PROBIOTIC 4X) 10-15 mg TbEC Take  by mouth. Provider, Historical   ergocalciferol, vitamin D2, (VITAMIN D2 PO) Take  by mouth.     Provider, Historical     Allergies   Allergen Reactions    Gluten Diarrhea    Other Medication Nausea and Vomiting     Patient states that she is intolerant of general anesthesia and requires that an anti-nausea medication be given first.       Family History   Problem Relation Age of Onset    Cancer Father         melanoma and prostatate    Elevated Lipids Father     Hypertension Father     Diabetes Father     Cancer Maternal Grandmother         uterine    Hypertension Maternal Grandmother     Heart Disease Maternal Grandmother     Cancer Maternal Grandfather         brain cancer    Heart Disease Maternal Grandfather     Hypertension Maternal Grandfather     Cancer Paternal Grandmother         breast cancer    Diabetes Paternal Grandmother     Cancer Paternal Grandfather         throid    Diabetes Paternal Grandfather     Hypertension Paternal Grandfather     Stroke Paternal Grandfather     Anxiety Mother         diabetes borderline, POTS, fibromyalgia, IBS    Hypertension Brother         etohsim        SOCIAL HISTORY:  Patient resides:  Independently x   Assisted Living    SNF    With family care       Smoking history:   None x   Former    Chronic      Alcohol history:   None x   Social    Chronic      Ambulates:   Independently    w/cane    w/walker    w/wc    CODE STATUS:  DNR    Full x   Other      Objective:     Physical Exam:     Visit Vitals  /63 (BP 1 Location: Left arm, BP Patient Position: At rest)   Pulse 100   Temp 100.1 °F (37.8 °C)   Resp 16   Wt 58.8 kg (129 lb 10.1 oz)   SpO2 97%   BMI 20.30 kg/m²      O2 Device: Room air    General:  Alert, cooperative, no distress, appears stated age. Head:  Normocephalic, without obvious abnormality, atraumatic. Eyes:  Conjunctivae/corneas clear. PERRL, EOMs intact. Nose: Nares normal. Septum midline. Mucosa normal. No drainage or sinus tenderness. Throat: Lips, mucosa, and tongue normal. Teeth and gums normal.   Neck: Supple, symmetrical, trachea midline, no adenopathy, thyroid: no enlargement/tenderness/nodules, no carotid bruit and no JVD. Back:   Symmetric, no curvature. ROM normal. No CVA tenderness. Lungs:   Clear to auscultation bilaterally. Chest wall:  Right breast redness, tenderness to touch. No induration or mass palpable    Heart:  Regular rate and rhythm, S1, S2 normal, no murmur, click, rub or gallop. Abdomen:   Soft, non-tender. Bowel sounds normal. No masses,  No organomegaly. Extremities: Extremities normal, atraumatic, no cyanosis or edema. Pulses: 2+ and symmetric all extremities. Skin: Skin color, texture, turgor normal. No rashes or lesions   Neurologic: CNII-XII intact. No focal weakness              Data Review:     Recent Days:  Recent Labs     07/06/19  1027   WBC 15.3*   HGB 13.3   HCT 40.1   *     Recent Labs     07/06/19  1027      K 3.6      CO2 25   *   BUN 13   CREA 0.77   CA 9.0   ALB 4.2   SGOT 14*   ALT 23     No results for input(s): PH, PCO2, PO2, HCO3, FIO2 in the last 72 hours. 24 Hour Results:  Recent Results (from the past 24 hour(s))   SAMPLES BEING HELD    Collection Time: 07/06/19 10:27 AM   Result Value Ref Range    SAMPLES BEING HELD 1RED 1BLUE 1PST 1LAV     COMMENT        Add-on orders for these samples will be processed based on acceptable specimen integrity and analyte stability, which may vary by analyte.    CBC WITH AUTOMATED DIFF Collection Time: 07/06/19 10:27 AM   Result Value Ref Range    WBC 15.3 (H) 3.6 - 11.0 K/uL    RBC 4.57 3.80 - 5.20 M/uL    HGB 13.3 11.5 - 16.0 g/dL    HCT 40.1 35.0 - 47.0 %    MCV 87.7 80.0 - 99.0 FL    MCH 29.1 26.0 - 34.0 PG    MCHC 33.2 30.0 - 36.5 g/dL    RDW 13.2 11.5 - 14.5 %    PLATELET 578 (L) 953 - 400 K/uL    MPV 13.0 (H) 8.9 - 12.9 FL    NRBC 0.0 0  WBC    ABSOLUTE NRBC 0.00 0.00 - 0.01 K/uL    NEUTROPHILS 91 (H) 32 - 75 %    LYMPHOCYTES 3 (L) 12 - 49 %    MONOCYTES 5 5 - 13 %    EOSINOPHILS 0 0 - 7 %    BASOPHILS 0 0 - 1 %    IMMATURE GRANULOCYTES 1 (H) 0.0 - 0.5 %    ABS. NEUTROPHILS 13.8 (H) 1.8 - 8.0 K/UL    ABS. LYMPHOCYTES 0.5 (L) 0.8 - 3.5 K/UL    ABS. MONOCYTES 0.8 0.0 - 1.0 K/UL    ABS. EOSINOPHILS 0.0 0.0 - 0.4 K/UL    ABS. BASOPHILS 0.0 0.0 - 0.1 K/UL    ABS. IMM. GRANS. 0.2 (H) 0.00 - 0.04 K/UL    DF SMEAR SCANNED      RBC COMMENTS OVALOCYTES  PRESENT       METABOLIC PANEL, COMPREHENSIVE    Collection Time: 07/06/19 10:27 AM   Result Value Ref Range    Sodium 144 136 - 145 mmol/L    Potassium 3.6 3.5 - 5.1 mmol/L    Chloride 103 97 - 108 mmol/L    CO2 25 21 - 32 mmol/L    Anion gap 16 (H) 5 - 15 mmol/L    Glucose 120 (H) 65 - 100 mg/dL    BUN 13 6 - 20 MG/DL    Creatinine 0.77 0.55 - 1.02 MG/DL    BUN/Creatinine ratio 17 12 - 20      GFR est AA >60 >60 ml/min/1.73m2    GFR est non-AA >60 >60 ml/min/1.73m2    Calcium 9.0 8.5 - 10.1 MG/DL    Bilirubin, total 1.0 0.2 - 1.0 MG/DL    ALT (SGPT) 23 12 - 78 U/L    AST (SGOT) 14 (L) 15 - 37 U/L    Alk. phosphatase 100 45 - 117 U/L    Protein, total 7.6 6.4 - 8.2 g/dL    Albumin 4.2 3.5 - 5.0 g/dL    Globulin 3.4 2.0 - 4.0 g/dL    A-G Ratio 1.2 1.1 - 2.2     POC LACTIC ACID    Collection Time: 07/06/19 10:56 AM   Result Value Ref Range    Lactic Acid (POC) 1.07 0.40 - 2.00 mmol/L         Imaging:   Us Chest    Result Date: 7/6/2019  IMPRESSION: 1. No drainable fluid collection identified in the region of clinical concern.  Outpatient evaluation and clinical management are recommended. Assessment:     Active Problems:    Mastitis (7/6/2019)           Plan:     1. Acute right mastitis: continue IV ancef, follow wbc, if no improvement, consider change to vanco. Lactation consult and breast pump prn.   2. Systemic inflammatory response syndrome: due to above.  IVF and monitor        Signed By: Libertad Chaudhry MD     July 6, 2019

## 2019-07-06 NOTE — ED TRIAGE NOTES
Triage note: Pt states she has had right sided breast pain since yesterday. Is breast feeding her 14m old. Also began having a fever yesterday. Called OB and was given an RX for dicloxacillin. Pt has been nauseous and not eating or drinking well.  Hx of mastitis in the past.

## 2019-07-06 NOTE — ED NOTES
Report called to Ravin Hernandez RN at [facility]; time for questions provided.  Aware of patient's pending arrival.

## 2019-07-06 NOTE — LACTATION NOTE
Asked to see patient that was admitted with mastitis of the right breast. She currently breastfeeds her 13 month old 5 times per day. She states she started feeling poorly a few days ago and it continued to escalate to mastitis. Her right breast is very tender on the lower aspect. No redness noted. I have recommended to mom that she pump if infant is not nursing to facilitate release of the affected duct. Mom states she does not want to pump at this time and that child will be coming in to nurse this evening. I recommended using a warm compress to the breast while nursing, and to massage the affected area, and to have child begin feeding on the side with the mastitis. Ice can be applied following feeding for comfort. Mom is on Ancef which is an L1 drug (Breastfeeding category) and is compatible with breastfeeding.

## 2019-07-07 LAB
ALBUMIN SERPL-MCNC: 3 G/DL (ref 3.5–5)
ALBUMIN/GLOB SERPL: 1.3 {RATIO} (ref 1.1–2.2)
ALP SERPL-CCNC: 82 U/L (ref 45–117)
ALT SERPL-CCNC: 17 U/L (ref 12–78)
ANION GAP SERPL CALC-SCNC: 8 MMOL/L (ref 5–15)
AST SERPL-CCNC: 15 U/L (ref 15–37)
BASOPHILS # BLD: 0 K/UL (ref 0–0.1)
BASOPHILS NFR BLD: 0 % (ref 0–1)
BILIRUB SERPL-MCNC: 0.4 MG/DL (ref 0.2–1)
BUN SERPL-MCNC: 8 MG/DL (ref 6–20)
BUN/CREAT SERPL: 15 (ref 12–20)
CALCIUM SERPL-MCNC: 8 MG/DL (ref 8.5–10.1)
CHLORIDE SERPL-SCNC: 114 MMOL/L (ref 97–108)
CO2 SERPL-SCNC: 22 MMOL/L (ref 21–32)
CREAT SERPL-MCNC: 0.53 MG/DL (ref 0.55–1.02)
DIFFERENTIAL METHOD BLD: ABNORMAL
EOSINOPHIL # BLD: 0.1 K/UL (ref 0–0.4)
EOSINOPHIL NFR BLD: 2 % (ref 0–7)
ERYTHROCYTE [DISTWIDTH] IN BLOOD BY AUTOMATED COUNT: 13.6 % (ref 11.5–14.5)
GLOBULIN SER CALC-MCNC: 2.4 G/DL (ref 2–4)
GLUCOSE SERPL-MCNC: 83 MG/DL (ref 65–100)
HCG UR QL: NEGATIVE
HCT VFR BLD AUTO: 38.6 % (ref 35–47)
HGB BLD-MCNC: 11.3 G/DL (ref 11.5–16)
IMM GRANULOCYTES # BLD AUTO: 0 K/UL (ref 0–0.04)
IMM GRANULOCYTES NFR BLD AUTO: 0 % (ref 0–0.5)
LYMPHOCYTES # BLD: 1.3 K/UL (ref 0.8–3.5)
LYMPHOCYTES NFR BLD: 16 % (ref 12–49)
MAGNESIUM SERPL-MCNC: 2.1 MG/DL (ref 1.6–2.4)
MCH RBC QN AUTO: 29.1 PG (ref 26–34)
MCHC RBC AUTO-ENTMCNC: 29.3 G/DL (ref 30–36.5)
MCV RBC AUTO: 99.5 FL (ref 80–99)
MONOCYTES # BLD: 0.7 K/UL (ref 0–1)
MONOCYTES NFR BLD: 8 % (ref 5–13)
NEUTS SEG # BLD: 6.1 K/UL (ref 1.8–8)
NEUTS SEG NFR BLD: 74 % (ref 32–75)
NRBC # BLD: 0 K/UL (ref 0–0.01)
NRBC BLD-RTO: 0 PER 100 WBC
PHOSPHATE SERPL-MCNC: 3 MG/DL (ref 2.6–4.7)
PLATELET # BLD AUTO: 101 K/UL (ref 150–400)
PMV BLD AUTO: 13 FL (ref 8.9–12.9)
POTASSIUM SERPL-SCNC: 4 MMOL/L (ref 3.5–5.1)
PROT SERPL-MCNC: 5.4 G/DL (ref 6.4–8.2)
RBC # BLD AUTO: 3.88 M/UL (ref 3.8–5.2)
SODIUM SERPL-SCNC: 144 MMOL/L (ref 136–145)
WBC # BLD AUTO: 8.3 K/UL (ref 3.6–11)

## 2019-07-07 PROCEDURE — 77030012890

## 2019-07-07 PROCEDURE — 85025 COMPLETE CBC W/AUTO DIFF WBC: CPT

## 2019-07-07 PROCEDURE — 36415 COLL VENOUS BLD VENIPUNCTURE: CPT

## 2019-07-07 PROCEDURE — 99218 HC RM OBSERVATION: CPT

## 2019-07-07 PROCEDURE — 84100 ASSAY OF PHOSPHORUS: CPT

## 2019-07-07 PROCEDURE — 80053 COMPREHEN METABOLIC PANEL: CPT

## 2019-07-07 PROCEDURE — 96366 THER/PROPH/DIAG IV INF ADDON: CPT

## 2019-07-07 PROCEDURE — 74011250636 HC RX REV CODE- 250/636: Performed by: HOSPITALIST

## 2019-07-07 PROCEDURE — 83735 ASSAY OF MAGNESIUM: CPT

## 2019-07-07 PROCEDURE — 74011250637 HC RX REV CODE- 250/637: Performed by: HOSPITALIST

## 2019-07-07 PROCEDURE — 65410000002 HC RM PRIVATE OB

## 2019-07-07 PROCEDURE — 74011000258 HC RX REV CODE- 258: Performed by: HOSPITALIST

## 2019-07-07 PROCEDURE — 81025 URINE PREGNANCY TEST: CPT

## 2019-07-07 RX ORDER — OXYCODONE HYDROCHLORIDE 5 MG/1
5 TABLET ORAL
Status: DISCONTINUED | OUTPATIENT
Start: 2019-07-07 | End: 2019-07-08 | Stop reason: HOSPADM

## 2019-07-07 RX ORDER — LANOLIN ALCOHOL/MO/W.PET/CERES
500 CREAM (GRAM) TOPICAL DAILY
COMMUNITY

## 2019-07-07 RX ADMIN — CEFAZOLIN 1 G: 1 INJECTION, POWDER, FOR SOLUTION INTRAMUSCULAR; INTRAVENOUS at 06:20

## 2019-07-07 RX ADMIN — SODIUM CHLORIDE 100 ML/HR: 900 INJECTION, SOLUTION INTRAVENOUS at 15:12

## 2019-07-07 RX ADMIN — CEFAZOLIN 1 G: 1 INJECTION, POWDER, FOR SOLUTION INTRAMUSCULAR; INTRAVENOUS at 22:31

## 2019-07-07 RX ADMIN — Medication 10 ML: at 22:35

## 2019-07-07 RX ADMIN — CEFAZOLIN 1 G: 1 INJECTION, POWDER, FOR SOLUTION INTRAMUSCULAR; INTRAVENOUS at 14:13

## 2019-07-07 RX ADMIN — ACETAMINOPHEN 650 MG: 325 TABLET ORAL at 00:29

## 2019-07-07 NOTE — PROGRESS NOTES
Hospitalist Progress Note  Leanne Greer MD  Answering service: [phone] from in house phone        Date of Service:  2019  NAME:  Brandon Lopez  :  1980  MRN:  [id number]      Admission Summary:   Brandon Lopez is a 41 y.o. female who presents with right breast pain and redness     Interval history / Subjective:    R breast remains tender, she thinks perhaps with more erythema, or worsened spread. No further nausea. Assessment & Plan:     Sepsis (POA) secondary to acute R mastitis and cellulitis - improving.  - Continue ancef. WBC improving  - If cellulitis continues to worsen will add vancomycin to cover for MRSA. - lactation consultant following, appreciate assistance  - Continue pumping   - US negative for abscess  - F/U blood cultures, NGTD  - Pt requesting OB consult, and pregnancy test.     N/V - likely secondary to sepsis - monitor with antiemetics. Code status: FULL  DVT prophylaxis: Frequent ambulation, SCDs    Care Plan discussed with: Patient/Family  Disposition: TBD     Hospital Problems  Date Reviewed: 2019          Codes Class Noted POA    * (Principal) Mastitis ICD-10-CM: N61.0  ICD-9-CM: 611.0  2019 Yes                Review of Systems:   A comprehensive review of systems was negative except for that written in the HPI. Vital Signs:    Last 24hrs VS reviewed since prior progress note.  Most recent are:  Visit Vitals  /73 (BP 1 Location: Left arm, BP Patient Position: At rest)   Pulse 81   Temp 98.4 °F (36.9 °C)   Resp 16   Wt 58.8 kg (129 lb 10.1 oz)   SpO2 98%   BMI 20.30 kg/m²         Intake/Output Summary (Last 24 hours) at 2019 1252  Last data filed at 2019 6268  Gross per 24 hour   Intake 2210 ml   Output 1050 ml   Net 1160 ml        Physical Examination:             Constitutional:  No acute distress, cooperative, pleasant    ENT:  Oral mucous moist, oropharynx benign. Resp:  CTA bilaterally. No wheezing/rhonchi/rales. No accessory muscle use  Breast: R breast with mild erythema and warmth, no satellite lesions, no fluctuance. CV:  Regular rhythm, normal rate, no murmurs, gallops, rubs    GI:  Soft, non distended, non tender. normoactive bowel sounds, no hepatosplenomegaly     Musculoskeletal:  No edema, warm, 2+ pulses throughout    Neurologic:  Moves all extremities. AAOx3, CN II-XII reviewed     Psych:  Good insight, Not anxious nor agitated. Data Review: All imaging and laboratory data reviewed. Labs:     Recent Labs     07/07/19 0623 07/06/19  1027   WBC 8.3 15.3*   HGB 11.3* 13.3   HCT 38.6 40.1   * 122*     Recent Labs     07/07/19 0623 07/06/19  1027    144   K 4.0 3.6   * 103   CO2 22 25   BUN 8 13   CREA 0.53* 0.77   GLU 83 120*   CA 8.0* 9.0   MG 2.1  --    PHOS 3.0  --      Recent Labs     07/07/19 0623 07/06/19  1027   SGOT 15 14*   ALT 17 23   AP 82 100   TBILI 0.4 1.0   TP 5.4* 7.6   ALB 3.0* 4.2   GLOB 2.4 3.4     No results for input(s): INR, PTP, APTT in the last 72 hours. No lab exists for component: INREXT   No results for input(s): FE, TIBC, PSAT, FERR in the last 72 hours. Lab Results   Component Value Date/Time    Folate 11.3 05/10/2018 02:11 PM      No results for input(s): PH, PCO2, PO2 in the last 72 hours. No results for input(s): CPK, CKNDX, TROIQ in the last 72 hours.     No lab exists for component: CPKMB  Lab Results   Component Value Date/Time    Cholesterol, total 155 05/16/2019 10:34 AM    HDL Cholesterol 68 05/16/2019 10:34 AM    LDL, calculated 74 05/16/2019 10:34 AM    Triglyceride 66 05/16/2019 10:34 AM     Lab Results   Component Value Date/Time    Glucose (POC) 96 09/22/2013 06:31 PM     Lab Results   Component Value Date/Time    Color Yellow 05/10/2018 02:11 PM    Appearance Clear 05/10/2018 02:11 PM    Specific gravity 1.030 09/24/2017 05:37 PM    pH (UA) 6.0 05/10/2018 02:11 PM    Protein NEGATIVE  09/24/2017 05:37 PM    Glucose NEGATIVE  09/24/2017 05:37 PM    Ketone 1+ (A) 05/10/2018 02:11 PM    Bilirubin Negative 05/10/2018 02:11 PM    Urobilinogen 1.0 09/24/2017 05:37 PM    Nitrites Negative 05/10/2018 02:11 PM    Leukocyte Esterase 1+ (A) 05/10/2018 02:11 PM    Epithelial cells MANY (A) 09/24/2017 05:37 PM    Bacteria None seen 05/10/2018 02:11 PM    WBC 0-5 05/10/2018 02:11 PM    RBC 0-2 05/10/2018 02:11 PM         Medications Reviewed:     Current Facility-Administered Medications   Medication Dose Route Frequency    sodium chloride (NS) flush 5-40 mL  5-40 mL IntraVENous Q8H    sodium chloride (NS) flush 5-40 mL  5-40 mL IntraVENous PRN    naloxone (NARCAN) injection 0.4 mg  0.4 mg IntraVENous PRN    acetaminophen (TYLENOL) tablet 650 mg  650 mg Oral Q4H PRN    ondansetron (ZOFRAN) injection 4 mg  4 mg IntraVENous Q4H PRN    ceFAZolin (ANCEF) 1 g in 0.9% sodium chloride (MBP/ADV) 50 mL  1 g IntraVENous Q8H    0.9% sodium chloride infusion  100 mL/hr IntraVENous CONTINUOUS     ______________________________________________________________________  EXPECTED LENGTH OF STAY: 3d 12h  ACTUAL LENGTH OF STAY:          1                 Rocio Ireland MD

## 2019-07-07 NOTE — PROGRESS NOTES
Bedside and Verbal shift change report given to AMARIS Best RN (oncoming nurse) by Basil Mercado RN (offgoing nurse). Report included the following information SBAR, Kardex, ED Summary, Procedure Summary, Intake/Output, MAR, Accordion and Recent Results.

## 2019-07-07 NOTE — PROGRESS NOTES
Bedside and Verbal shift change report given to Abel Abbott RN (oncoming nurse) by Chance Reveles  (offgoing nurse). Report included the following information SBAR, Kardex, ED Summary, Intake/Output, MAR, Accordion and Recent Results. Pt request to have OB MD and     1022- MD Stearns paged. 1- MD Stearns update with Pt requests of  pregnancy test and OB consult. Ok to be put orders in per MD. Provider will reach out to on call MD regarding consult . 1310- consult called to [address] [place] aware of pt. OB Hospitalist will cover today.

## 2019-07-07 NOTE — CONSULTS
Gyn Consult    Subjective:     Reji Everett is a 41 y. o.  premenopausal female who is being seen for mastitis refractory to outpatient oral Dicloxicillin. She is breastfeeding her 13 month old and noted a redness and pain in her right breast.  Her OB put her on Diclox, but she proceeded to develop a fever, with assoc n/v after initially improving. She was subsequently seen in the ED and admitted to the Medicine Hospitalist Service.     OB/GYN ROS: she complains of right breast pain and redness and requested a pregnancy test  OB/GYN history: obstetric history: ( : 6, Para: 6, Misc/Ab: 0)    Patient Active Problem List    Diagnosis Date Noted    Mastitis 2019    Irritable bowel syndrome without diarrhea 2018    Normal delivery 2018    Rh negative status during pregnancy 2013     Past Medical History:   Diagnosis Date    Anemia     taking iron    Complication of anesthesia     severe vomiting; requires antinausea medication first    Disease of blood and blood forming organ     Irritable bowel syndrome without diarrhea     colonoscopy  Dr Senait Jones.  she is gluten-free    Kidney disease 2018    kidney stones    Psychiatric problem 2015    anxiety, panic attack after last delivery    Rh negative status during pregnancy 2013    rhogham given 2015    Thrombocytopenia ([address]     last labs plt were 118    Thyroid activity decreased     tested multiple times, continued to improve through pregnancy, on no meds at this time      Past Surgical History:   Procedure Laterality Date    HX COLONOSCOPY      Dr Nenita Joseph HX OTHER SURGICAL      wisdom, echo to check for murmur     HX WISDOM TEETH EXTRACTION        Social History     Tobacco Use    Smoking status: Never Smoker    Smokeless tobacco: Never Used   Substance Use Topics    Alcohol use: No      Family History   Problem Relation Age of Onset    Cancer Father         melanoma and prostatate    Elevated Lipids Father     Hypertension Father     Diabetes Father     Cancer Maternal Grandmother         uterine    Hypertension Maternal Grandmother     Heart Disease Maternal Grandmother     Cancer Maternal Grandfather         brain cancer    Heart Disease Maternal Grandfather     Hypertension Maternal Grandfather     Cancer Paternal Grandmother         breast cancer    Diabetes Paternal Grandmother     Cancer Paternal Grandfather         throid    Diabetes Paternal Grandfather     Hypertension Paternal Grandfather     Stroke Paternal Grandfather     Anxiety Mother         diabetes borderline, POTS, fibromyalgia, IBS    Hypertension Brother         etohsim      Prior to Admission Medications   Prescriptions Last Dose Informant Patient Reported? Taking? B.infantis-B.ani-B.long-B.bifi (PROBIOTIC 4X) 10-15 mg TbEC 2019 at Unknown time  Yes Yes   Sig: Take  by mouth.   cyanocobalamin (VITAMIN B-12) 500 mcg tablet 2019 at Unknown time  Yes Yes   Sig: Take 500 mcg by mouth daily. ergocalciferol, vitamin D2, (VITAMIN D2 PO) 2019 at Unknown time  Yes Yes   Sig: Take  by mouth.       Facility-Administered Medications: None     Allergies   Allergen Reactions    Gluten Diarrhea    Other Medication Nausea and Vomiting     Patient states that she is intolerant of general anesthesia and requires that an anti-nausea medication be given first.         Review of Systems:  10 point ROS,  A comprehensive review of systems was negative except for: Constitutional: positive for fevers and malaise  Gastrointestinal: positive for nausea and vomiting  Integument/breast: positive for skin color change and breast tenderness     Objective:     Patient Vitals for the past 8 hrs:   BP Temp Pulse Resp SpO2   19 0811 112/73 98.4 °F (36.9 °C) 81 16 98 %     Temp (24hrs), Av.3 °F (37.4 °C), Min:98.1 °F (36.7 °C), Max:100.8 °F (38.2 °C)     0701 -  1900  In: 120 [P.O.:120]  Out: 300 [Urine:300]    Physical Exam:   General appearance: alert, cooperative, no distress, appears stated age  Breasts: normal appearance, no masses or tenderness, positive findings: mild-mod erythema of right breast 5-8 o'clock (initial area marked)  Abdomen: soft, non-tender. Bowel sounds normal. No masses. Extremities: extremities normal, atraumatic, no cyanosis or edema    Labs:    Recent Results (from the past 24 hour(s))   CBC WITH AUTOMATED DIFF    Collection Time: 07/07/19  6:23 AM   Result Value Ref Range    WBC 8.3 3.6 - 11.0 K/uL    RBC 3.88 3.80 - 5.20 M/uL    HGB 11.3 (L) 11.5 - 16.0 g/dL    HCT 38.6 35.0 - 47.0 %    MCV 99.5 (H) 80.0 - 99.0 FL    MCH 29.1 26.0 - 34.0 PG    MCHC 29.3 (L) 30.0 - 36.5 g/dL    RDW 13.6 11.5 - 14.5 %    PLATELET 293 (L) 756 - 400 K/uL    MPV 13.0 (H) 8.9 - 12.9 FL    NRBC 0.0 0  WBC    ABSOLUTE NRBC 0.00 0.00 - 0.01 K/uL    NEUTROPHILS 74 32 - 75 %    LYMPHOCYTES 16 12 - 49 %    MONOCYTES 8 5 - 13 %    EOSINOPHILS 2 0 - 7 %    BASOPHILS 0 0 - 1 %    IMMATURE GRANULOCYTES 0 0.0 - 0.5 %    ABS. NEUTROPHILS 6.1 1.8 - 8.0 K/UL    ABS. LYMPHOCYTES 1.3 0.8 - 3.5 K/UL    ABS. MONOCYTES 0.7 0.0 - 1.0 K/UL    ABS. EOSINOPHILS 0.1 0.0 - 0.4 K/UL    ABS. BASOPHILS 0.0 0.0 - 0.1 K/UL    ABS. IMM. GRANS. 0.0 0.00 - 0.04 K/UL    DF AUTOMATED     METABOLIC PANEL, COMPREHENSIVE    Collection Time: 07/07/19  6:23 AM   Result Value Ref Range    Sodium 144 136 - 145 mmol/L    Potassium 4.0 3.5 - 5.1 mmol/L    Chloride 114 (H) 97 - 108 mmol/L    CO2 22 21 - 32 mmol/L    Anion gap 8 5 - 15 mmol/L    Glucose 83 65 - 100 mg/dL    BUN 8 6 - 20 MG/DL    Creatinine 0.53 (L) 0.55 - 1.02 MG/DL    BUN/Creatinine ratio 15 12 - 20      GFR est AA >60 >60 ml/min/1.73m2    GFR est non-AA >60 >60 ml/min/1.73m2    Calcium 8.0 (L) 8.5 - 10.1 MG/DL    Bilirubin, total 0.4 0.2 - 1.0 MG/DL    ALT (SGPT) 17 12 - 78 U/L    AST (SGOT) 15 15 - 37 U/L    Alk.  phosphatase 82 45 - 117 U/L Protein, total 5.4 (L) 6.4 - 8.2 g/dL    Albumin 3.0 (L) 3.5 - 5.0 g/dL    Globulin 2.4 2.0 - 4.0 g/dL    A-G Ratio 1.3 1.1 - 2.2     MAGNESIUM    Collection Time: 07/07/19  6:23 AM   Result Value Ref Range    Magnesium 2.1 1.6 - 2.4 mg/dL   PHOSPHORUS    Collection Time: 07/07/19  6:23 AM   Result Value Ref Range    Phosphorus 3.0 2.6 - 4.7 MG/DL   HCG URINE, QL    Collection Time: 07/07/19 10:50 AM   Result Value Ref Range    HCG urine, QL NEGATIVE  NEG         Imaging:   ultrasound negative    Assessment:     Principal Problem:    Mastitis (7/6/2019)     Improving on IV Ancef    Plan:     I reviewed with Charley Encarnacion her medical records, physical exam, and review of symptoms. current treatment plan is effective, no change in therapy  We did discuss screening for Candida infection if mastitis does not continue to improve.     Signed By: Robert Guzman MD     July 7, 2019

## 2019-07-08 VITALS
HEART RATE: 67 BPM | RESPIRATION RATE: 16 BRPM | OXYGEN SATURATION: 98 % | SYSTOLIC BLOOD PRESSURE: 121 MMHG | DIASTOLIC BLOOD PRESSURE: 80 MMHG | TEMPERATURE: 98 F | BODY MASS INDEX: 20.3 KG/M2 | WEIGHT: 129.63 LBS

## 2019-07-08 PROBLEM — A41.9 SEPSIS (HCC): Status: ACTIVE | Noted: 2019-07-08

## 2019-07-08 LAB
ANION GAP SERPL CALC-SCNC: 5 MMOL/L (ref 5–15)
BASOPHILS # BLD: 0 K/UL (ref 0–0.1)
BASOPHILS NFR BLD: 1 % (ref 0–1)
BUN SERPL-MCNC: 4 MG/DL (ref 6–20)
BUN/CREAT SERPL: 9 (ref 12–20)
CALCIUM SERPL-MCNC: 8.3 MG/DL (ref 8.5–10.1)
CHLORIDE SERPL-SCNC: 112 MMOL/L (ref 97–108)
CO2 SERPL-SCNC: 25 MMOL/L (ref 21–32)
CREAT SERPL-MCNC: 0.46 MG/DL (ref 0.55–1.02)
DIFFERENTIAL METHOD BLD: ABNORMAL
EOSINOPHIL # BLD: 0.3 K/UL (ref 0–0.4)
EOSINOPHIL NFR BLD: 6 % (ref 0–7)
ERYTHROCYTE [DISTWIDTH] IN BLOOD BY AUTOMATED COUNT: 13.4 % (ref 11.5–14.5)
GLUCOSE SERPL-MCNC: 82 MG/DL (ref 65–100)
HCT VFR BLD AUTO: 35 % (ref 35–47)
HGB BLD-MCNC: 11.2 G/DL (ref 11.5–16)
IMM GRANULOCYTES # BLD AUTO: 0 K/UL (ref 0–0.04)
IMM GRANULOCYTES NFR BLD AUTO: 0 % (ref 0–0.5)
LYMPHOCYTES # BLD: 1.5 K/UL (ref 0.8–3.5)
LYMPHOCYTES NFR BLD: 33 % (ref 12–49)
MAGNESIUM SERPL-MCNC: 1.8 MG/DL (ref 1.6–2.4)
MCH RBC QN AUTO: 29.3 PG (ref 26–34)
MCHC RBC AUTO-ENTMCNC: 32 G/DL (ref 30–36.5)
MCV RBC AUTO: 91.6 FL (ref 80–99)
MONOCYTES # BLD: 0.5 K/UL (ref 0–1)
MONOCYTES NFR BLD: 11 % (ref 5–13)
NEUTS SEG # BLD: 2.3 K/UL (ref 1.8–8)
NEUTS SEG NFR BLD: 49 % (ref 32–75)
NRBC # BLD: 0 K/UL (ref 0–0.01)
NRBC BLD-RTO: 0 PER 100 WBC
PHOSPHATE SERPL-MCNC: 3 MG/DL (ref 2.6–4.7)
PLATELET # BLD AUTO: 98 K/UL (ref 150–400)
PMV BLD AUTO: 12.8 FL (ref 8.9–12.9)
POTASSIUM SERPL-SCNC: 3.7 MMOL/L (ref 3.5–5.1)
RBC # BLD AUTO: 3.82 M/UL (ref 3.8–5.2)
SODIUM SERPL-SCNC: 142 MMOL/L (ref 136–145)
WBC # BLD AUTO: 4.6 K/UL (ref 3.6–11)

## 2019-07-08 PROCEDURE — 74011250637 HC RX REV CODE- 250/637: Performed by: INTERNAL MEDICINE

## 2019-07-08 PROCEDURE — 99218 HC RM OBSERVATION: CPT

## 2019-07-08 PROCEDURE — 80048 BASIC METABOLIC PNL TOTAL CA: CPT

## 2019-07-08 PROCEDURE — 74011000258 HC RX REV CODE- 258: Performed by: HOSPITALIST

## 2019-07-08 PROCEDURE — 83735 ASSAY OF MAGNESIUM: CPT

## 2019-07-08 PROCEDURE — 96366 THER/PROPH/DIAG IV INF ADDON: CPT

## 2019-07-08 PROCEDURE — 84100 ASSAY OF PHOSPHORUS: CPT

## 2019-07-08 PROCEDURE — 85025 COMPLETE CBC W/AUTO DIFF WBC: CPT

## 2019-07-08 PROCEDURE — 36415 COLL VENOUS BLD VENIPUNCTURE: CPT

## 2019-07-08 PROCEDURE — 74011250636 HC RX REV CODE- 250/636: Performed by: HOSPITALIST

## 2019-07-08 RX ORDER — CEPHALEXIN 500 MG/1
500 CAPSULE ORAL EVERY 8 HOURS
Qty: 21 CAP | Refills: 0 | Status: SHIPPED | OUTPATIENT
Start: 2019-07-08 | End: 2019-07-15

## 2019-07-08 RX ORDER — ONDANSETRON 4 MG/1
4 TABLET, FILM COATED ORAL
Qty: 25 TAB | Refills: 0 | Status: SHIPPED | OUTPATIENT
Start: 2019-07-08 | End: 2020-01-28 | Stop reason: ALTCHOICE

## 2019-07-08 RX ORDER — OXYCODONE HYDROCHLORIDE 5 MG/1
5 TABLET ORAL
Qty: 8 TAB | Refills: 0 | Status: SHIPPED | OUTPATIENT
Start: 2019-07-08 | End: 2019-07-11

## 2019-07-08 RX ORDER — CEPHALEXIN 500 MG/1
500 CAPSULE ORAL EVERY 8 HOURS
Status: DISCONTINUED | OUTPATIENT
Start: 2019-07-08 | End: 2019-07-08 | Stop reason: HOSPADM

## 2019-07-08 RX ADMIN — CEPHALEXIN 500 MG: 500 CAPSULE ORAL at 09:43

## 2019-07-08 RX ADMIN — CEFAZOLIN 1 G: 1 INJECTION, POWDER, FOR SOLUTION INTRAMUSCULAR; INTRAVENOUS at 06:27

## 2019-07-08 NOTE — DISCHARGE SUMMARY
Discharge Summary       PATIENT ID: David Sanders  MRN: [id number]   YOB: 1980    DATE OF ADMISSION: 7/6/2019 10:15 AM    DATE OF DISCHARGE: 07/08/2019   PRIMARY CARE PROVIDER: Rios Blackwell MD     DISCHARGING PROVIDER: Madison Alejandra MD    To contact this individual call [phone] and ask the  to page. If unavailable ask to be transferred the Adult Hospitalist Department. CONSULTATIONS: IP CONSULT TO OB GYN  IP CONSULT TO OB HOSPITALIST    PROCEDURES/SURGERIES: * No surgery found *    ADMITTING DIAGNOSES & HOSPITAL COURSE:   Sepsis   R breast mastitis and cellulitis     Lo Charles is a 41 y.o. female who presents with right breast pain and redness. + Temp, HR, and WBC, consistent with sepsis. Started on cefazolin with improvement. US breast was negative for abscess. Fever improved, and cellulitis improved. Stable for discharge 7/7    DISCHARGE DIAGNOSES / PLAN:      Sepsis (POA) secondary to acute R mastitis and cellulitis - improving  - switch ancef to kephlex, continue for 7 days. WBC improving  - lactation consultant following, appreciate assistance  - Continue pumping, and breastfeeding at home  - Warm compress, and massage  - US negative for abscess  - F/U blood cultures, NGTD  - F/U with OB if no improvement.      N/V - likely secondary to sepsis - monitor with antiemetics     ADDITIONAL CARE RECOMMENDATIONS:  1. Please take all medications as prescribed. Note changes as below. - Add as needed zofran  - Add antibitoic, kephelx for an additional 7 days. - As needed pain medications, you can also take tylenol and ibuprofen as needed. 2. Please make sure to follow up with your primary care physician or with your OB within 1-2 weeks of discharge for hospital follow up, if you are not improving. 3. Please make continue to monitor for: worsening redness, pain or swelling of the area.    4. Continue warm compress, massage, and frequent breastfeeding. PENDING TEST RESULTS:   At the time of discharge the following test results are still pending: None    FOLLOW UP APPOINTMENTS:    Follow-up Information     Follow up With Specialties Details Why Contact Info    Genesis Leija MD Internal Medicine In 1 week  [address]  Internal Medicine [place] [address] [facility]  [phone]      OB, call for follow up if not improving. DIET: Regular Diet    ACTIVITY: Activity as tolerated    WOUND CARE: None    EQUIPMENT needed: none      DISCHARGE MEDICATIONS:  Current Discharge Medication List      START taking these medications    Details   cephALEXin (KEFLEX) 500 mg capsule Take 1 Cap by mouth every eight (8) hours for 7 days. Qty: 21 Cap, Refills: 0      oxyCODONE IR (ROXICODONE) 5 mg immediate release tablet Take 1 Tab by mouth every four (4) hours as needed for Pain for up to 3 days. Max Daily Amount: 30 mg.  Qty: 8 Tab, Refills: 0    Associated Diagnoses: Mastitis      ondansetron hcl (ZOFRAN) 4 mg tablet Take 1 Tab by mouth every eight (8) hours as needed for Nausea. Qty: 25 Tab, Refills: 0         CONTINUE these medications which have NOT CHANGED    Details   cyanocobalamin (VITAMIN B-12) 500 mcg tablet Take 500 mcg by mouth daily. B.infantis-B.ani-B.long-B.bifi (PROBIOTIC 4X) 10-15 mg TbEC Take  by mouth.      ergocalciferol, vitamin D2, (VITAMIN D2 PO) Take  by mouth. NOTIFY YOUR PHYSICIAN FOR ANY OF THE FOLLOWING:   Fever over 101 degrees for 24 hours. Chest pain, shortness of breath, fever, chills, nausea, vomiting, diarrhea, change in mentation, falling, weakness, bleeding. Severe pain or pain not relieved by medications. Or, any other signs or symptoms that you may have questions about.     DISPOSITION:  X  Home With:   OT  PT  HH  RN       Long term SNF/Inpatient Rehab    Independent/assisted living    Hospice    Other:       PATIENT CONDITION AT DISCHARGE: Functional status    Poor     Deconditioned    X Independent      Cognition   X  Lucid     Forgetful     Dementia      Catheters/lines (plus indication)    [place]     PICC     PEG    X None      Code status    X Full code     DNR      PHYSICAL EXAMINATION AT DISCHARGE:  Visit Vitals  /80 (BP 1 Location: Left arm, BP Patient Position: At rest)   Pulse 67   Temp 98 °F (36.7 °C)   Resp 16   Wt 58.8 kg (129 lb 10.1 oz)   SpO2 98%   BMI 20.30 kg/m²     Gen: NAD, awake in bed  HEENT: NC/AT, sclera anicteric, PERRL, EOMI  CV: RRR no m/r/g  Breast: Improving, no erythema noted today, small quarter sized swelling, consistent with potential clogged milk duct.    Resp: CTA b/l no increased work of breathing  Abd: NT/ND, normal bowel sounds  Ext: 2+ pulses, no edema  Skin: No rashes        CHRONIC MEDICAL DIAGNOSES:  Problem List as of 7/8/2019 Date Reviewed: 5/16/2019          Codes Class Noted - Resolved    * (Principal) Mastitis ICD-10-CM: N61.0  ICD-9-CM: 611.0  7/6/2019 - Present        Irritable bowel syndrome without diarrhea ICD-10-CM: K58.9  ICD-9-CM: 564.1  6/19/2018 - Present        Normal delivery ICD-10-CM: O80  ICD-9-CM: 456  2/23/2018 - Present        Rh negative status during pregnancy ICD-10-CM: O26.899, Z67.91  ICD-9-CM: 646.83  9/22/2013 - Present        RESOLVED: Pregnancy ICD-10-CM: Z34.90  ICD-9-CM: V22.2  9/16/2015 - 2/23/2018              Greater than 30 minutes were spent with the patient on counseling and coordination of care    Signed:   Heydi Shah MD  7/8/2019  8:46 AM

## 2019-07-08 NOTE — DISCHARGE INSTRUCTIONS
Discharge Instructions       PATIENT ID: Brandon Lopez  MRN: [id number]   YOB: 1980    DATE OF ADMISSION: 7/6/2019 10:15 AM    DATE OF DISCHARGE: 7/8/2019    PRIMARY CARE PROVIDER: Florida Butler MD     ATTENDING PHYSICIAN: Carolina Dillard*  DISCHARGING PROVIDER: Leanne Greer MD    To contact this individual call [phone] and ask the  to page. If unavailable ask to be transferred the Adult Hospitalist Department. DISCHARGE DIAGNOSES   Sepsis   R breast mastitis and cellulitis     CONSULTATIONS: IP CONSULT TO OB GYN  IP CONSULT TO OB HOSPITALIST    PROCEDURES/SURGERIES: * No surgery found *    PENDING TEST RESULTS:   At the time of discharge the following test results are still pending: None    FOLLOW UP APPOINTMENTS:   Follow-up Information     Follow up With Specialties Details Why Contact Info    Florida Butler MD Internal Medicine In 1 week  [address]  Internal Medicine [facility] [address] [facility]  [phone]      OB, call for follow up if not improving. ADDITIONAL CARE RECOMMENDATIONS:   1. Please take all medications as prescribed. Note changes as below. - Add as needed zofran  - Add antibitoic, kephelx for an additional 7 days. - As needed pain medications, you can also take tylenol and ibuprofen as needed. 2. Please make sure to follow up with your primary care physician or with your OB within 1-2 weeks of discharge for hospital follow up, if you are not improving. 3. Please make continue to monitor for: worsening redness, pain or swelling of the area. 4. Continue warm compress, massage, and frequent breastfeeding. DIET: Regular Diet    ACTIVITY: Activity as tolerated    WOUND CARE: None    EQUIPMENT needed: none      DISCHARGE MEDICATIONS:   See Medication Reconciliation Form    · It is important that you take the medication exactly as they are prescribed.    · Keep your medication in the bottles provided by the pharmacist and keep a list of the medication names, dosages, and times to be taken in your wallet. · Do not take other medications without consulting your doctor. NOTIFY YOUR PHYSICIAN FOR ANY OF THE FOLLOWING:   Fever over 101 degrees for 24 hours. Chest pain, shortness of breath, fever, chills, nausea, vomiting, diarrhea, change in mentation, falling, weakness, bleeding. Severe pain or pain not relieved by medications. Or, any other signs or symptoms that you may have questions about. DISPOSITION:  X  Home With:   OT  PT  HH  RN       SNF/Inpatient Rehab/LTAC    Independent/assisted living    Hospice    Other:     CDMP Checked: Yes X     PROBLEM LIST Updated:   Yes X       Signed:   Yusra Lake MD  7/8/2019  8:52 AM

## 2019-07-08 NOTE — PROGRESS NOTES
Bedside and Verbal shift change report given to AMARIS Cowart RN (oncoming nurse) by Donald Brooks RN (offgoing nurse). Report included the following information SBAR, Kardex, Intake/Output, MAR and Accordion. 21:00 Pt assessed, redness decreasing from original markings.  No nausea/ vomiting    23:00 Pt sleeping

## 2019-07-08 NOTE — PROGRESS NOTES
Bedside and Verbal shift change report given to Teresa OLGUIN (oncoming nurse) by Eliana Aj (offgoing nurse). Report included the following information SBAR, Kardex, Intake/Output, MAR and Recent Results. 12: Hospitalist at bedside. 1200: I have reviewed discharge instructions with the patient. The patient verbalized understanding. Discharge medications reviewed with patient and appropriate educational materials and side effects teaching were provided.     1315: pt left to discharge lot

## 2019-07-11 LAB
BACTERIA SPEC CULT: NORMAL
BACTERIA SPEC CULT: NORMAL
SERVICE CMNT-IMP: NORMAL
SERVICE CMNT-IMP: NORMAL

## 2019-07-25 ENCOUNTER — OFFICE VISIT (OUTPATIENT)
Dept: INTERNAL MEDICINE CLINIC | Age: 39
End: 2019-07-25

## 2019-07-25 VITALS
HEART RATE: 85 BPM | HEIGHT: 67 IN | BODY MASS INDEX: 20.37 KG/M2 | TEMPERATURE: 98.1 F | WEIGHT: 129.8 LBS | RESPIRATION RATE: 12 BRPM | SYSTOLIC BLOOD PRESSURE: 123 MMHG | OXYGEN SATURATION: 95 % | DIASTOLIC BLOOD PRESSURE: 80 MMHG

## 2019-07-25 DIAGNOSIS — N61.0 MASTITIS: ICD-10-CM

## 2019-07-25 DIAGNOSIS — E53.8 B12 DEFICIENCY: ICD-10-CM
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
## 2019-07-25 DIAGNOSIS — Z78.9 CONSUMES A VEGAN DIET: Primary | ICD-10-CM

## 2019-07-25 RX ORDER — CEPHALEXIN 500 MG/1
500 CAPSULE ORAL 3 TIMES DAILY
Qty: 21 CAP | Refills: 0 | Status: SHIPPED | OUTPATIENT
Start: 2019-07-25 | End: 2019-11-08

## 2019-07-25 NOTE — PROGRESS NOTES
Chief Complaint   Patient presents with   [facility] Follow Up     Mastitis  Pt was on dicloxacillin had emesis so to er and given Ancef and zofran and then transitioned to Keflex at home. Follow up breast US next week. B12 deficiency  Patient had switched to a vegetarian diet. She has now added B12 to her regimen  Cut out iodine      Take bite  After May appointment patient reports she was bit by tick. She had an area on her left inner thigh with a circular lesion around it. She also had an area under her right breast under her bra strap. She reports she did feel a lot of fatigue and malaise but did not get treated with doxycycline. Fatigue  Patient is sleeping 8 hours intermittent, feels rested but not well rested. Not tired during the day. Patient has 7 children ages 12-3 yo. Her 8year-old child was diagnosed with PAN S and is having behavioral issues at home.         Past Medical History:   Diagnosis Date    Anemia 2015    taking iron    Complication of anesthesia     severe vomiting; requires antinausea medication first    Disease of blood and blood forming organ     Irritable bowel syndrome without diarrhea     colonoscopy 2011 Dr Lexi Ramirez.  she is gluten-free    Kidney disease 2018    kidney stones    Psychiatric problem 2015    anxiety, panic attack after last delivery    Rh negative status during pregnancy 9/22/2013    rhogham given 6/22/2015    Thrombocytopenia ([facility] [address]     last labs plt were 118    Thyroid activity decreased 2015    tested multiple times, continued to improve through pregnancy, on no meds at this time     Past Surgical History:   Procedure Laterality Date    HX COLONOSCOPY      Dr Shahida Chappell HX OTHER SURGICAL      wisdom, echo to check for murmur     HX WISDOM TEETH EXTRACTION       Social History     Socioeconomic History    Marital status:      Spouse name: Not on file    Number of children: Not on file    Years of education: Not on file    Highest education level: Not on file   Tobacco Use    Smoking status: Never Smoker    Smokeless tobacco: Never Used   Substance and Sexual Activity    Alcohol use: No    Drug use: No    Sexual activity: Yes     Partners: Male     Birth control/protection: None   Social History Narrative    Mother of 7 children            15, 8, 8 6,, 5 (adopted) , 5, 2, 2 months     She adopted the 11year old    All healthy     Family History   Problem Relation Age of Onset    Cancer Father         melanoma and prostatate    Elevated Lipids Father     Hypertension Father     Diabetes Father     Cancer Maternal Grandmother         uterine    Hypertension Maternal Grandmother     Heart Disease Maternal Grandmother     Cancer Maternal Grandfather         brain cancer    Heart Disease Maternal Grandfather     Hypertension Maternal Grandfather     Cancer Paternal Grandmother         breast cancer    Diabetes Paternal Grandmother     Cancer Paternal Grandfather         throid    Diabetes Paternal Grandfather     Hypertension Paternal Grandfather     Stroke Paternal Grandfather     Anxiety Mother         diabetes borderline, POTS, fibromyalgia, IBS    Hypertension Brother         etohsim     Current Outpatient Medications   Medication Sig Dispense Refill    cyanocobalamin (VITAMIN B-12) 500 mcg tablet Take 500 mcg by mouth daily.  B.infantis-B.ani-B.long-B.bifi (PROBIOTIC 4X) 10-15 mg TbEC Take  by mouth.  ondansetron hcl (ZOFRAN) 4 mg tablet Take 1 Tab by mouth every eight (8) hours as needed for Nausea. (Patient taking differently: Take 4 mg by mouth every eight (8) hours as needed for Nausea. Indications: not taking) 25 Tab 0    ergocalciferol, vitamin D2, (VITAMIN D2 PO) Take  by mouth.  Indications: not taking       Allergies   Allergen Reactions    Gluten Diarrhea    Other Medication Nausea and Vomiting     Patient states that she is intolerant of general anesthesia and requires that an anti-nausea medication be given first.        Review of Systems - General ROS: positive for  - fatigue, malaise and sleep disturbance  negative for - chills or fever  Cardiovascular ROS: positive for -chest burning  Respiratory ROS: positive for - cough, shortness of breath and denies wheezing   Visit Vitals  /80 (BP 1 Location: Left arm, BP Patient Position: Sitting)   Pulse 85   Temp 98.1 °F (36.7 °C) (Oral)   Resp 12   Ht 5' 7\" (1.702 m)   Wt 129 lb 12.8 oz (58.9 kg)   LMP 05/15/2017 (Approximate)   SpO2 95%   Breastfeeding? Yes   BMI 20.33 kg/m²     General Appearance:  Well developed, well nourished,alert and oriented x 3, and individual in no acute distress. Ears/Nose/Mouth/Throat:   Hearing grossly normal.  Breasts: Bilaterally dense, no masses or cysts in axilla bilaterally no supraclavicular lymph nodes       Neck: Supple, no lad, no bruits   Chest:   Lungs clear to auscultation bilaterally. Cardiovascular:  Regular rate and rhythm, S1, S2 normal, no murmur. Abdomen:   Soft, non-tender, bowel sounds are active. Extremities: No edema bilaterally.     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 Ana Puente, [facility]  Skin nonhealing in 2 weeks, father with melanoma, Dr. Arnold Flores abnormal bleeding/discharge/abd pain/pressure Dr. Effie Hodgkins Dr. Genoveva Cedars hematologist      Thyroid sx  3/2017  Us with mousalli in January    Osteopenia prevention  Calcium 1000mg/day yes  Vitamin D 800iu/day yes    Mental health scale: 7/10  2 special needs kids  Situational stress  Depression  Anxiety  Sleep # of hours:  Energy Level:        Immunizations DEFERS immunizations in general   TDAP 2013  Pneumonia vaccine  Flu vaccine  Shingles vaccine  HPV      Diagnoses and all orders for this visit:    1. Consumes a vegan diet  Check for deficiency  -     T4, FREE  -     TSH 3RD GENERATION  -     VITAMIN B12  -     IODINE, SERUM OR PLASMA    2. Mastitis  Patient became septic after mastitis. She will keep antibiotic on hand. She plans to stop breast-feeding in the fall.  -     cephALEXin (KEFLEX) 500 mg capsule; Take 1 Cap by mouth three (3) times daily. 3. B12 deficiency  Will reassess and replete as needed    4. Tick bite, subsequent encounter  We will assess and see if she needs doxy  -     LYME AB TOTAL W/RFLX W BLOT    Follow-up for annual or earlier if needed.

## 2019-07-30 DIAGNOSIS — E61.8 IODINE DEFICIENCY: Primary | ICD-10-CM

## 2019-07-30 LAB
B BURGDOR IGG+IGM SER-ACNC: <0.91 ISR (ref 0–0.9)
IODINE SERPL-MCNC: 34.2 UG/L (ref 40–92)
T4 FREE SERPL-MCNC: 1.2 NG/DL (ref 0.82–1.77)
TSH SERPL DL<=0.005 MIU/L-ACNC: 0.81 UIU/ML (ref 0.45–4.5)
VIT B12 SERPL-MCNC: 566 PG/ML (ref 232–1245)

## 2019-08-12 DIAGNOSIS — M25.542 ARTHRALGIA OF BOTH HANDS: Primary | ICD-10-CM

## 2019-08-12 DIAGNOSIS — M25.541 ARTHRALGIA OF BOTH HANDS: Primary | ICD-10-CM

## 2019-08-15 LAB
ANA SER QL: NEGATIVE
ERYTHROCYTE [SEDIMENTATION RATE] IN BLOOD BY WESTERGREN METHOD: 2 MM/HR (ref 0–32)

## 2019-08-28 ENCOUNTER — OFFICE VISIT (OUTPATIENT)
Dept: INTERNAL MEDICINE CLINIC | Age: 39
End: 2019-08-28

## 2019-08-28 ENCOUNTER — TELEPHONE (OUTPATIENT)
Dept: INTERNAL MEDICINE CLINIC | Age: 39
End: 2019-08-28

## 2019-08-28 VITALS
DIASTOLIC BLOOD PRESSURE: 84 MMHG | HEART RATE: 96 BPM | BODY MASS INDEX: 19.87 KG/M2 | HEIGHT: 67 IN | TEMPERATURE: 97.8 F | RESPIRATION RATE: 16 BRPM | WEIGHT: 126.6 LBS | OXYGEN SATURATION: 98 % | SYSTOLIC BLOOD PRESSURE: 121 MMHG

## 2019-08-28 DIAGNOSIS — M25.542 ARTHRALGIA OF BOTH HANDS: Primary | ICD-10-CM

## 2019-08-28 DIAGNOSIS — M25.541 ARTHRALGIA OF BOTH HANDS: Primary | ICD-10-CM

## 2019-08-28 DIAGNOSIS — F43.9 SITUATIONAL STRESS: ICD-10-CM

## 2019-08-28 NOTE — PROGRESS NOTES
1. Have you been to the ER, urgent care clinic since your last visit? Hospitalized since your last visit? No    2. Have you seen or consulted any other health care providers outside of the [address] [place] [address] since your last visit? Include any pap smears or colon screening. No     Chief Complaint   Patient presents with    Joint Pain     x1 month. Patient states toe, knee, fingers, shoulder pain. Patient denies any home treatment.         Visit Vitals  /84 (BP 1 Location: Right arm, BP Patient Position: Sitting)   Pulse (!) 113   Temp 97.8 °F (36.6 °C) (Oral)   Resp 16   Ht 5' 7\" (1.702 m)   Wt 126 lb 9.6 oz (57.4 kg)   SpO2 98%   BMI 19.83 kg/m²

## 2019-08-28 NOTE — PATIENT INSTRUCTIONS
Learning About Mindfulness for Stress  What are mindfulness and stress? Stress is what you feel when you have to handle more than you are used to. A lot of things can cause stress. You may feel stress when you go on a job interview, take a test, or run a race. This kind of short-term stress is normal and even useful. It can help you if you need to work hard or react quickly. Stress also can last a long time. Long-term stress is caused by stressful situations or events. Examples of long-term stress include long-term health problems, ongoing problems at work, and conflicts in your family. Long-term stress can harm your health. Mindfulness is a focus only on things happening in the present moment. It's a process of purposefully paying attention to and being aware of your surroundings, your emotions, your thoughts, and how your body feels. You are aware of these things, but you aren't judging these experiences as \"good\" or \"bad. \" Mindfulness can help you learn to calm your mind and body to help you cope with illness, pain, and stress. How does mindfulness help to relieve stress? Mindfulness can help quiet your mind and relax your body. Studies show that it can help some people sleep better, feel less anxious, and bring their blood pressure down. And it's been shown to help some people live and cope better with certain health problems like heart disease, depression, chronic pain, and cancer. How do you practice mindfulness? To be mindful is to pay attention, to be present, and to be accepting. · When you're mindful, you do just one thing and you pay close attention to that one thing. For example, you may sit quietly and notice your emotions or how your food tastes and smells. · When you're present, you focus on the things that are happening right now. You let go of your thoughts about the past and the future. When you dwell on the past or the future, you miss moments that can heal and strengthen you.  You may miss moments like hearing a child laugh or seeing a friendly face when you think you're all alone. · When you're accepting, you don't  the present moment. Instead you accept your thoughts and feelings as they come. You can practice anytime, anywhere, and in any way you choose. You can practice in many ways. Here are a few ideas:  · While doing your chores, like washing the dishes, let your mind focus on what's in your hand. What does the dish feel like? Is the water warm or cold? · Go outside and take a few deep breaths. What is the air like? Is it warm or cold? · When you can, take some time at the start of your day to sit alone and think. · Take a slow walk by yourself. Count your steps while you breathe in and out. · Try yoga breathing exercises, stretches, and poses to strengthen and relax your muscles. · At work, if you can, try to stop for a few moments each hour. Note how your body feels. Let yourself regroup and let your mind settle before you return to what you were doing. · If you struggle with anxiety or \"worry thoughts,\" imagine your mind as a blue erickson and your worry thoughts as clouds. Now imagine those worry thoughts floating across your mind's erickson. Just let them pass by as you watch. Follow-up care is a key part of your treatment and safety. Be sure to make and go to all appointments, and call your doctor if you are having problems. It's also a good idea to know your test results and keep a list of the medicines you take. Where can you learn more? Go to http://brea-amanda.info/. Enter G194 in the search box to learn more about \"Learning About Mindfulness for Stress. \"  Current as of: June 28, 2018  Content Version: 12.1  © 4819-7588 [employer]wise, Incorporated. Care instructions adapted under license by [employer] (which disclaims liability or warranty for this information).  If you have questions about a medical condition or this instruction, always ask your healthcare professional. [place] [address] any warranty or liability for your use of this information.

## 2019-08-28 NOTE — TELEPHONE ENCOUNTER
Discussed with Dr Reed Forman nurse, patient offered an apt today at 3pm , patient aware and was very grateful .

## 2019-08-28 NOTE — PROGRESS NOTES
Chief Complaint   Patient presents with    Joint Pain     x1 month. Patient states toe, knee, fingers, shoulder pain. Patient denies any home treatment. Joint pain  Patient reports she has been having Fingers, knuckles, toes, knees primarily but also wrists and ankles. She feels like her joints are more popping in nature. She notes it is sporatic in terms of intensity. Sx for a month  No fevers no night sweats. She has had some vaginal discharge, increased tooth sensitivty. She reports it is the same through the day. It does not improve as the day goes on.   sporatic  Dull pain  She is concerned she has an underlying cancer. She reports seeing her hematologist 2 weeks prior to joint pain and cbc ws normal wbc was 4.9    She will go to [place] with family    Mastitis  No furher sx but pt concerned it occured        Fatigue  Patient is sleeping 8 hours intermittent, feels rested but not well rested. Not tired during the day. Patient has 7 children ages 12-3 yo. Her 8year-old child was diagnosed with PAN S and is having behavioral issues at home.         Past Medical History:   Diagnosis Date    Anemia 2015    taking iron    Complication of anesthesia     severe vomiting; requires antinausea medication first    Disease of blood and blood forming organ     Irritable bowel syndrome without diarrhea     colonoscopy 2011 Dr Cox Class.  she is gluten-free    Kidney disease 2018    kidney stones    Psychiatric problem 2015    anxiety, panic attack after last delivery    Rh negative status during pregnancy 9/22/2013    rhogham given 6/22/2015    Thrombocytopenia ([facility] [address]     last labs plt were 118    Thyroid activity decreased 2015    tested multiple times, continued to improve through pregnancy, on no meds at this time     Past Surgical History:   Procedure Laterality Date    HX COLONOSCOPY      Dr Cox Class    HX OTHER SURGICAL      wisdom, echo to check for murmur     HX WISDOM TEETH EXTRACTION       Social History     Socioeconomic History    Marital status:      Spouse name: Not on file    Number of children: Not on file    Years of education: Not on file    Highest education level: Not on file   Tobacco Use    Smoking status: Never Smoker    Smokeless tobacco: Never Used   Substance and Sexual Activity    Alcohol use: No    Drug use: No    Sexual activity: Yes     Partners: Male     Birth control/protection: None   Social History Narrative    Mother of 7 children            15, 8, 8 6,, 5 (adopted) , 5, 2, 2 months     She adopted the 11year old        9 yo diagnosed since daughter was 13years old     Family History   Problem Relation Age of Onset    Cancer Father         melanoma and prostatate    Elevated Lipids Father     Hypertension Father     Diabetes Father     Cancer Maternal Grandmother         uterine    Hypertension Maternal Grandmother     Heart Disease Maternal Grandmother     Cancer Maternal Grandfather         brain cancer    Heart Disease Maternal Grandfather     Hypertension Maternal Grandfather     Cancer Paternal Grandmother         breast cancer    Diabetes Paternal Grandmother     Cancer Paternal Grandfather         throid    Diabetes Paternal Grandfather     Hypertension Paternal Grandfather     Stroke Paternal Grandfather     Anxiety Mother         diabetes borderline, POTS, fibromyalgia, IBS    Hypertension Brother         etohsim     Current Outpatient Medications   Medication Sig Dispense Refill    cyanocobalamin (VITAMIN B-12) 500 mcg tablet Take 500 mcg by mouth daily.  B.infantis-B.ani-B.long-B.bifi (PROBIOTIC 4X) 10-15 mg TbEC Take  by mouth.  ergocalciferol, vitamin D2, (VITAMIN D2 PO) Take  by mouth. Indications: not taking      iodine (KELP, IODINE,) 150 mcg tab Take 1 Tab by mouth daily. 30 Tab 1    cephALEXin (KEFLEX) 500 mg capsule Take 1 Cap by mouth three (3) times daily.  21 Cap 0    ondansetron hcl (ZOFRAN) 4 mg tablet Take 1 Tab by mouth every eight (8) hours as needed for Nausea. (Patient taking differently: Take 4 mg by mouth every eight (8) hours as needed for Nausea. Indications: not taking) 25 Tab 0     Allergies   Allergen Reactions    Gluten Diarrhea    Other Medication Nausea and Vomiting     Patient states that she is intolerant of general anesthesia and requires that an anti-nausea medication be given first.        Review of Systems - General ROS: positive for  - fatigue, malaise and sleep disturbance  negative for - chills or fever  Cardiovascular ROS: positive for -chest burning  Respiratory ROS: positive for - cough, shortness of breath and denies wheezing   Visit Vitals  /84 (BP 1 Location: Right arm, BP Patient Position: Sitting)   Pulse (!) 113   Temp 97.8 °F (36.6 °C) (Oral)   Resp 16   Ht 5' 7\" (1.702 m)   Wt 126 lb 9.6 oz (57.4 kg)   SpO2 98%   BMI 19.83 kg/m²     General Appearance:  Well developed, well nourished,alert and oriented x 3, and individual in no acute distress. Ears/Nose/Mouth/Throat:   Hearing grossly normal.  Breasts: Bilaterally dense, no masses or cysts in axilla bilaterally no supraclavicular lymph nodes       Neck: Supple, no lad, no bruits   Chest:   Lungs clear to auscultation bilaterally. Cardiovascular:  Regular rate and rhythm, S1, S2 normal, no murmur. Abdomen:   Soft, non-tender, bowel sounds are active. Extremities: No edema bilaterally.     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 Cordelia Goldberg, [facility]  Skin nonhealing in 2 weeks, father with melanoma, Dr. Sow Smiling abnormal bleeding/discharge/abd pain/pressure Dr. Francois Ramirez Yves Alberto hematologist      Thyroid sx  3/2017  Us with ryley in January    Osteopenia prevention  Calcium 1000mg/day yes  Vitamin D 800iu/day yes    Mental health scale: 7/10  2 special needs kids  Situational stress  Depression  Anxiety  Sleep # of hours:  Energy Level:        Immunizations DEFERS immunizations in general   TDAP 2013  Pneumonia vaccine  Flu vaccine  Shingles vaccine  HPV      Diagnoses and all orders for this visit:    1. Arthralgia of both hands  On exam, no boggy or inflamed joints. We will check labs I suspect they will be normal  -     CBC WITH AUTOMATED DIFF  -     SED RATE (ESR)  -     RHEUMATOID FACTOR, QT  -     RHEUMASSURE    2. Situational stress  Patient with daughter with pandas chronic illness. She prefers not to be on any medication. Agrees to see Omid Santillan LCSW  -     REFERRAL TO PSYCHIATRY    Spent 25 minutes with this patient greater than 50% of the time was spent in management and counseling patient with regards to her joint pain symptoms as well as her anxiety. She reports she has not been diagnosed with anxiety but she is aware that she has it. She prefers no medication. I think she would benefit from counseling with Nagi Roblero and possibly doing some breathing exercises to help alleviate some of her symptoms. Empathetic listening was provided and solutions such as understanding the process of anxiety was explored and discussed as well as medications versus counseling.

## 2019-08-28 NOTE — TELEPHONE ENCOUNTER
----- Message from Jackie Delvalle sent at 8/28/2019  7:59 AM EDT -----  Regarding: Dr. Leona Forman: [phone]  Pt experiencing joint pain and would like to be seen today or tomorrow. However, there are no available appts.

## 2019-08-30 ENCOUNTER — TELEPHONE (OUTPATIENT)
Dept: INTERNAL MEDICINE CLINIC | Age: 39
End: 2019-08-30

## 2019-08-30 DIAGNOSIS — E61.8 IODINE DEFICIENCY: ICD-10-CM

## 2019-08-30 NOTE — TELEPHONE ENCOUNTER
Patient called requesting lab results before the weekend if possible. Please call patient to advise. [phone]

## 2019-09-03 NOTE — TELEPHONE ENCOUNTER
Returned call to pt. She was checking on the lab results from 8/28/19. I advised her that we do not have the results back yet. Pt thanks and states [place] or a call with the results.  She thanks for the call and disconnects the line

## 2019-09-03 NOTE — TELEPHONE ENCOUNTER
Patient called returning nurses call.  Patient asked that Brooklynn Vu please return her call.     [phone]

## 2019-09-04 ENCOUNTER — TELEPHONE (OUTPATIENT)
Dept: INTERNAL MEDICINE CLINIC | Age: 39
End: 2019-09-04

## 2019-09-05 LAB
14.3.3 ETA, RHEUM. ARTHRITIS: <0.2 NG/ML
BASOPHILS # BLD AUTO: 0.1 X10E3/UL (ref 0–0.2)
BASOPHILS NFR BLD AUTO: 1 %
CCP IGA+IGG SERPL IA-ACNC: <20 UNITS
EOSINOPHIL # BLD AUTO: 0.2 X10E3/UL (ref 0–0.4)
EOSINOPHIL NFR BLD AUTO: 3 %
ERYTHROCYTE [DISTWIDTH] IN BLOOD BY AUTOMATED COUNT: 13.1 % (ref 12.3–15.4)
ERYTHROCYTE [SEDIMENTATION RATE] IN BLOOD BY WESTERGREN METHOD: 17 MM/HR (ref 0–32)
HCT VFR BLD AUTO: 39.3 % (ref 34–46.6)
HGB BLD-MCNC: 13.3 G/DL (ref 11.1–15.9)
IMM GRANULOCYTES # BLD AUTO: 0 X10E3/UL (ref 0–0.1)
IMM GRANULOCYTES NFR BLD AUTO: 0 %
LYMPHOCYTES # BLD AUTO: 1.3 X10E3/UL (ref 0.7–3.1)
LYMPHOCYTES NFR BLD AUTO: 24 %
MCH RBC QN AUTO: 29.6 PG (ref 26.6–33)
MCHC RBC AUTO-ENTMCNC: 33.8 G/DL (ref 31.5–35.7)
MCV RBC AUTO: 87 FL (ref 79–97)
MONOCYTES # BLD AUTO: 0.4 X10E3/UL (ref 0.1–0.9)
MONOCYTES NFR BLD AUTO: 8 %
NEUTROPHILS # BLD AUTO: 3.3 X10E3/UL (ref 1.4–7)
NEUTROPHILS NFR BLD AUTO: 64 %
PLATELET # BLD AUTO: 152 X10E3/UL (ref 150–450)
RBC # BLD AUTO: 4.5 X10E6/UL (ref 3.77–5.28)
RHEUMATOID FACT SERPL-ACNC: <10 IU/ML (ref 0–13.9)
RHEUMATOID FACT SERPL-ACNC: <14 UNITS/ML
WBC # BLD AUTO: 5.2 X10E3/UL (ref 3.4–10.8)

## 2019-09-09 ENCOUNTER — TELEPHONE (OUTPATIENT)
Dept: INTERNAL MEDICINE CLINIC | Age: 39
End: 2019-09-09

## 2019-09-09 NOTE — TELEPHONE ENCOUNTER
Patient is requesting to speak with PCP personally to discuss her results, states she's Mac Leaver a couple changes and is very concerned \"  She can be reached at [phone] until 330pm or after 6pm today.

## 2019-09-09 NOTE — TELEPHONE ENCOUNTER
Patient states she was told PCP would call her with results in regards to her rash  and states she has never heard back from the office and is still having difficulty sleeping . Requesting to be worked in [place] since PCP cant speak with patient over the phone. States she can not wait for PCPs next opening.

## 2019-09-10 NOTE — TELEPHONE ENCOUNTER
Returned call to pt. Offered appt for tomorrow. Pt states she is unavailable tomorrow. Appt scheduled for Friday to review lab results, discuss increasing joint pain and rash.

## 2019-09-11 ENCOUNTER — TELEPHONE (OUTPATIENT)
Dept: INTERNAL MEDICINE CLINIC | Age: 39
End: 2019-09-11

## 2019-09-11 NOTE — TELEPHONE ENCOUNTER
Pt left message on vm, [facility]. Pt had many questions, asked if [facility] was [facility] Counselor. [facility] offered to see pt or refer her to [facility] counselor, pt set up appt for 9/24 but asked for referral to think about. Gave her [facility] counseling number.

## 2019-09-13 ENCOUNTER — OFFICE VISIT (OUTPATIENT)
Dept: INTERNAL MEDICINE CLINIC | Age: 39
End: 2019-09-13

## 2019-09-13 VITALS
HEART RATE: 95 BPM | OXYGEN SATURATION: 100 % | SYSTOLIC BLOOD PRESSURE: 115 MMHG | HEIGHT: 67 IN | TEMPERATURE: 98 F | BODY MASS INDEX: 19.83 KG/M2 | RESPIRATION RATE: 18 BRPM | DIASTOLIC BLOOD PRESSURE: 80 MMHG | WEIGHT: 126.38 LBS

## 2019-09-13 DIAGNOSIS — M25.50 ARTHRALGIA, UNSPECIFIED JOINT: Primary | ICD-10-CM

## 2019-09-13 DIAGNOSIS — R21 RASH: ICD-10-CM

## 2019-09-13 NOTE — PROGRESS NOTES
Chief Complaint   Patient presents with    Rash     Patient presents to follow-up with regards to her pain and rash. Joint pain  Since last visit patient went to [place] with her 7 kids and . She reports that she was good about going and not anxious but when she got to [place] in [place] she felt that her joints got worse. She noticed that she could not open up jars and her  had to open them up. She feels that it is in her hands and feet. She reports unable to open jars. She feels ankles knees toes. She notes it is worse in the evening. Pads of feet burning and throbbing. She notes it moves and worse at night. She still has extra popping and crackling. She is concerned she still may have Lyme disease. Rash  Son had poison oak on neck. She continues to get singular dots poping up. She saw Dr. [place] Dominguez who is a friend of hers. Skin was biopsied yesterday. She is concerned she got a rash at the area where she had mastitis. Fatigue  Patient is sleeping 8 hours intermittent, feels rested but not well rested. Not tired during the day. Patient has 7 children ages 12-5 yo. Her 8year-old child was diagnosed with PAN S and is having behavioral issues at home. Labs: Patient is concerned her sed rate has increased from last visit.       Past Medical History:   Diagnosis Date    Anemia 2015    taking iron    Complication of anesthesia     severe vomiting; requires antinausea medication first    Disease of blood and blood forming organ     Irritable bowel syndrome without diarrhea     colonoscopy 2011 Dr Sloan Leal.  she is gluten-free    Kidney disease 2018    kidney stones    Psychiatric problem 2015    anxiety, panic attack after last delivery    Rh negative status during pregnancy 9/22/2013    rhogham given 6/22/2015    Thrombocytopenia ([facility] [address]     last labs plt were 118    Thyroid activity decreased 2015    tested multiple times, continued to improve through pregnancy, on no meds at this time     Past Surgical History:   Procedure Laterality Date    HX COLONOSCOPY      Dr Abram Brandt  [address]      [place], echo to check for murmur     HX WISDOM TEETH EXTRACTION       Social History     Socioeconomic History    Marital status:      Spouse name: Not on file    Number of children: Not on file    Years of education: Not on file    Highest education level: Not on file   Tobacco Use    Smoking status: Never Smoker    Smokeless tobacco: Never Used   Substance and Sexual Activity    Alcohol use: No    Drug use: No    Sexual activity: Yes     Partners: Male     Birth control/protection: None   Social History Narrative    Mother of 7 children            15, 8, 8 6,, 5 (adopted) , 5, 2, 2 months     She adopted the 11year old        9 yo diagnosed since daughter was 13years old     Family History   Problem Relation Age of Onset    Cancer Father         melanoma and prostatate    Elevated Lipids Father     Hypertension Father     Diabetes Father     Cancer Maternal Grandmother         uterine    Hypertension Maternal Grandmother     Heart Disease Maternal Grandmother     Cancer Maternal Grandfather         brain cancer    Heart Disease Maternal Grandfather     Hypertension Maternal Grandfather     Cancer Paternal Grandmother         breast cancer    Diabetes Paternal Grandmother     Cancer Paternal Grandfather         throid    Diabetes Paternal Grandfather     Hypertension Paternal Grandfather     Stroke Paternal Grandfather     Anxiety Mother         diabetes borderline, POTS, fibromyalgia, IBS    Hypertension Brother         etohsim     Current Outpatient Medications   Medication Sig Dispense Refill    iodine (KELP, IODINE,) 150 mcg tab Take 1 Tab by mouth daily. (Patient taking differently: Take 1 Tab by mouth as needed.) 30 Tab 1    cyanocobalamin (VITAMIN B-12) 500 mcg tablet Take 500 mcg by mouth daily.       B.infantis-B.ani-B.long-B.bifi (PROBIOTIC 4X) 10-15 mg TbEC Take  by mouth.  cephALEXin (KEFLEX) 500 mg capsule Take 1 Cap by mouth three (3) times daily. 21 Cap 0    ondansetron hcl (ZOFRAN) 4 mg tablet Take 1 Tab by mouth every eight (8) hours as needed for Nausea. (Patient taking differently: Take 4 mg by mouth every eight (8) hours as needed for Nausea. Indications: not taking) 25 Tab 0    ergocalciferol, vitamin D2, (VITAMIN D2 PO) Take  by mouth. Indications: not taking       Allergies   Allergen Reactions    Gluten Diarrhea    Other Medication Nausea and Vomiting     Patient states that she is intolerant of general anesthesia and requires that an anti-nausea medication be given first.        Review of Systems - General ROS: positive for  - fatigue, malaise and sleep disturbance  negative for - chills or fever  Cardiovascular ROS: positive for -chest burning  Respiratory ROS: positive for - cough, shortness of breath and denies wheezing   Visit Vitals  /80 (BP 1 Location: Left arm, BP Patient Position: Sitting)   Pulse 95   Temp 98 °F (36.7 °C) (Oral)   Resp 18   Ht 5' 7\" (1.702 m)   Wt 126 lb 6 oz (57.3 kg)   SpO2 100%   BMI 19.79 kg/m²     General Appearance:  Well developed, well nourished,alert and oriented x 3, and individual in no acute distress. Ears/Nose/Mouth/Throat:   Hearing grossly normal.  Breasts: Bilaterally dense, no masses or cysts in axilla bilaterally no supraclavicular lymph nodes       Neck: Supple, no lad, no bruits   Chest:   Lungs clear to auscultation bilaterally. Cardiovascular:  Regular rate and rhythm, S1, S2 normal, no murmur. Abdomen:   Soft, non-tender, bowel sounds are active. Extremities: No edema bilaterally.     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 Magdiel Larson, [facility]  Skin nonhealing in 2 weeks, father with melanoma, Dr. Michele Hughes abnormal bleeding/discharge/abd pain/pressure Dr. Ryan Larson hematologist      Thyroid sx  3/2017  Us with mousalli in January    Osteopenia prevention  Calcium 1000mg/day yes  Vitamin D 800iu/day yes    Mental health scale: 7/10  2 special needs kids  Situational stress  Depression  Anxiety  Sleep # of hours:  Energy Level:        Immunizations DEFERS immunizations in general   TDAP 2013  Pneumonia vaccine  Flu vaccine  Shingles vaccine  HPV      Diagnoses and all orders for this visit:    1. Arthralgia, unspecified joint  Patient with continued episodic fluctuations of joint pain. I discussed with her that her Lyme IgG antibody may be positive. She is not interested in a 3-month course of doxy if not and if indicated. Patient with continued undulating type joint symptoms  Discussed with her that underlying cancers do not behave like this.  -     LYME AB, IGG & IGM BY WB  -     PARVOVIRUS B19 AB  -     SED RATE (ESR)    2. Rash  Pending biopsy results from Dr. Yuliana Jean-Baptiste. Spent 25 minutes with this patient greater than 50% of the time spent in management and counseling with regards to her arthralgia symptoms and will need to wait for biopsy from Dr. Yuliana Jean-Baptiste. It is possible that she may have some autoimmune low-grade issue however would not treat this with an immunomodulators or steroids. Further, movement of joint issues can be monitored but also consistent with increased hypersensitivity internally which may be more anxiety related.

## 2019-09-16 DIAGNOSIS — E53.8 B12 DEFICIENCY: ICD-10-CM

## 2019-09-18 LAB
B BURGDOR IGG PATRN SER IB-IMP: NEGATIVE
B BURGDOR IGM PATRN SER IB-IMP: NEGATIVE
B BURGDOR18KD IGG SER QL IB: NORMAL
B BURGDOR23KD IGG SER QL IB: NORMAL
B BURGDOR23KD IGM SER QL IB: NORMAL
B BURGDOR28KD IGG SER QL IB: NORMAL
B BURGDOR30KD IGG SER QL IB: NORMAL
B BURGDOR39KD IGG SER QL IB: NORMAL
B BURGDOR39KD IGM SER QL IB: NORMAL
B BURGDOR41KD IGG SER QL IB: NORMAL
B BURGDOR41KD IGM SER QL IB: NORMAL
B BURGDOR45KD IGG SER QL IB: NORMAL
B BURGDOR58KD IGG SER QL IB: NORMAL
B BURGDOR66KD IGG SER QL IB: NORMAL
B BURGDOR93KD IGG SER QL IB: NORMAL
B19V IGG SER IA-ACNC: 9 INDEX (ref 0–0.8)
B19V IGM SER IA-ACNC: 0.3 INDEX (ref 0–0.8)
ERYTHROCYTE [SEDIMENTATION RATE] IN BLOOD BY WESTERGREN METHOD: 5 MM/HR (ref 0–32)

## 2019-10-18 ENCOUNTER — TELEPHONE (OUTPATIENT)
Dept: INTERNAL MEDICINE CLINIC | Age: 39
End: 2019-10-18

## 2019-10-18 NOTE — TELEPHONE ENCOUNTER
Constantine Leon sent to [employer] Number: [phone]         General Message/Vendor Calls     Caller's first and last name: Chaim Odor       Reason for call: Records release request       Callback required yes/no and why: Yes, request follow up       Best contact number(s): [phone]       Details to clarify the request: Patient requesting records and lab results to be submitted to Dr. Christine Hernandez (Rheumatologist located [address]): Verónica Farah Fax #: [phone].  Please contact patient regarding request.     Gurinder Sahni

## 2019-11-06 ENCOUNTER — TELEPHONE (OUTPATIENT)
Dept: INTERNAL MEDICINE CLINIC | Age: 39
End: 2019-11-06

## 2019-11-07 ENCOUNTER — TELEPHONE (OUTPATIENT)
Dept: NEUROLOGY | Age: 39
End: 2019-11-07

## 2019-11-07 NOTE — TELEPHONE ENCOUNTER
----- Message from [employer] sent at 11/7/2019 10:16 AM EST -----  Regarding: Dr. Minda Hernandez  General Message/Vendor Calls    Caller's first and last name:Samanta([facility])      Reason for call:sooner appt      Callback required yes/no and why:no      Best contact number(s):[phone]      Details to clarify the request: Payton Patel requested a sooner appt for the pt. Please contact the pt.       [place] [facility]

## 2019-11-08 ENCOUNTER — HOSPITAL ENCOUNTER (EMERGENCY)
Age: 39
Discharge: HOME | End: 2019-11-08
Attending: EMERGENCY MEDICINE | Admitting: EMERGENCY MEDICINE
Payer: COMMERCIAL

## 2019-11-08 VITALS
HEIGHT: 67 IN | HEART RATE: 85 BPM | SYSTOLIC BLOOD PRESSURE: 116 MMHG | WEIGHT: 129.85 LBS | DIASTOLIC BLOOD PRESSURE: 79 MMHG | BODY MASS INDEX: 20.38 KG/M2 | RESPIRATION RATE: 19 BRPM | OXYGEN SATURATION: 100 % | TEMPERATURE: 98.1 F

## 2019-11-08 DIAGNOSIS — R07.9 ACUTE CHEST PAIN: Primary | ICD-10-CM

## 2019-11-08 LAB
ANION GAP SERPL CALC-SCNC: 12 MMOL/L (ref 5–15)
ATRIAL RATE: 97 BPM
BASOPHILS # BLD: 0.1 K/UL (ref 0–0.1)
BASOPHILS NFR BLD: 1 % (ref 0–1)
BUN SERPL-MCNC: 14 MG/DL (ref 6–20)
BUN/CREAT SERPL: 20 (ref 12–20)
CALCIUM SERPL-MCNC: 9.2 MG/DL (ref 8.5–10.1)
CALCULATED P AXIS, ECG09: 73 DEGREES
CALCULATED R AXIS, ECG10: 87 DEGREES
CALCULATED T AXIS, ECG11: 35 DEGREES
CHLORIDE SERPL-SCNC: 102 MMOL/L (ref 97–108)
CO2 SERPL-SCNC: 26 MMOL/L (ref 21–32)
COMMENT, HOLDF: NORMAL
CREAT SERPL-MCNC: 0.69 MG/DL (ref 0.55–1.02)
DIAGNOSIS, 93000: NORMAL
DIFFERENTIAL METHOD BLD: ABNORMAL
EOSINOPHIL # BLD: 0.4 K/UL (ref 0–0.4)
EOSINOPHIL NFR BLD: 6 % (ref 0–7)
ERYTHROCYTE [DISTWIDTH] IN BLOOD BY AUTOMATED COUNT: 13.1 % (ref 11.5–14.5)
GLUCOSE SERPL-MCNC: 119 MG/DL (ref 65–100)
HCT VFR BLD AUTO: 41.3 % (ref 35–47)
HGB BLD-MCNC: 13.4 G/DL (ref 11.5–16)
IMM GRANULOCYTES # BLD AUTO: 0 K/UL (ref 0–0.04)
IMM GRANULOCYTES NFR BLD AUTO: 0 % (ref 0–0.5)
LYMPHOCYTES # BLD: 2.6 K/UL (ref 0.8–3.5)
LYMPHOCYTES NFR BLD: 39 % (ref 12–49)
MCH RBC QN AUTO: 29.3 PG (ref 26–34)
MCHC RBC AUTO-ENTMCNC: 32.4 G/DL (ref 30–36.5)
MCV RBC AUTO: 90.2 FL (ref 80–99)
MONOCYTES # BLD: 0.5 K/UL (ref 0–1)
MONOCYTES NFR BLD: 8 % (ref 5–13)
NEUTS SEG # BLD: 3.1 K/UL (ref 1.8–8)
NEUTS SEG NFR BLD: 45 % (ref 32–75)
NRBC # BLD: 0 K/UL (ref 0–0.01)
NRBC BLD-RTO: 0 PER 100 WBC
P-R INTERVAL, ECG05: 132 MS
PLATELET # BLD AUTO: 147 K/UL (ref 150–400)
POTASSIUM SERPL-SCNC: 3.6 MMOL/L (ref 3.5–5.1)
Q-T INTERVAL, ECG07: 354 MS
QRS DURATION, ECG06: 84 MS
QTC CALCULATION (BEZET), ECG08: 449 MS
RBC # BLD AUTO: 4.58 M/UL (ref 3.8–5.2)
SAMPLES BEING HELD,HOLD: NORMAL
SODIUM SERPL-SCNC: 140 MMOL/L (ref 136–145)
TROPONIN I BLD-MCNC: <0.04 NG/ML (ref 0–0.08)
VENTRICULAR RATE, ECG03: 97 BPM
WBC # BLD AUTO: 6.7 K/UL (ref 3.6–11)

## 2019-11-08 PROCEDURE — 93005 ELECTROCARDIOGRAM TRACING: CPT

## 2019-11-08 PROCEDURE — 99284 EMERGENCY DEPT VISIT MOD MDM: CPT

## 2019-11-08 PROCEDURE — 84484 ASSAY OF TROPONIN QUANT: CPT

## 2019-11-08 PROCEDURE — 80048 BASIC METABOLIC PNL TOTAL CA: CPT

## 2019-11-08 PROCEDURE — 85025 COMPLETE CBC W/AUTO DIFF WBC: CPT

## 2019-11-08 PROCEDURE — 36415 COLL VENOUS BLD VENIPUNCTURE: CPT

## 2019-11-08 NOTE — ED NOTES
Patient provided with discharge instructions and verbalized understanding; PIV discontinued. Patient ambulatory out of department with steady gait.

## 2019-11-08 NOTE — ED TRIAGE NOTES
Patient arriving with c/o 2 episodes of 'electrical sensations' that made her feel too uncomfortable to sleep. Patient reports chills at home. Reports being worked up for possible untreated Lyme disease and new heart murmur.

## 2019-11-08 NOTE — TELEPHONE ENCOUNTER
I called the patient and notified that I do not have any new patient openings at this time. I did give her the advice to call the other offices to see if there is something earlier.  For her concern for MS

## 2019-11-08 NOTE — ED PROVIDER NOTES
51-year-old female who presents from home via private vehicle accompanied by her mother with a chief complaint of chest pain. Past medical history significant for anemia, irritable bowel, anxiety, thrombocytopenia, possibly Lyme's disease. The patient states she was lying in bed tonight when she developed shocklike pain that quickly radiated across her chest.  This occurred repeatedly over several seconds. There is no exacerbating or alleviating factors. She denies any shortness of breath. There is no cough, fever, nausea, vomiting, diarrhea. Patient does note a pruritic rash in patches throughout her body. She adds that she is in the middle of a work-up for untreated Lyme's disease and recently saw her rheumatologist.  She has no history of VTE. She does have a strong family history for coronary artery disease. She does not smoke or take illicit substances.            Past Medical History:   Diagnosis Date    Anemia 2015    taking iron    Complication of anesthesia     severe vomiting; requires antinausea medication first    Disease of blood and blood forming organ     Irritable bowel syndrome without diarrhea     colonoscopy 2011 Dr Steph Ledesma.  she is gluten-free    Kidney disease 2018    kidney stones    Psychiatric problem 2015    anxiety, panic attack after last delivery    Rh negative status during pregnancy 9/22/2013    rhogham given 6/22/2015    Thrombocytopenia ([address]     last labs plt were 118    Thyroid activity decreased 2015    tested multiple times, continued to improve through pregnancy, on no meds at this time       Past Surgical History:   Procedure Laterality Date    HX COLONOSCOPY      Dr Jonah Newton HX OTHER SURGICAL      wisdom, echo to check for murmur     HX WISDOM TEETH EXTRACTION           Family History:   Problem Relation Age of Onset    Cancer Father         melanoma and prostatate    Elevated Lipids Father     Hypertension Father     Diabetes Father     Cancer Maternal Grandmother         uterine    Hypertension Maternal Grandmother     Heart Disease Maternal Grandmother     Cancer Maternal Grandfather         brain cancer    Heart Disease Maternal Grandfather     Hypertension Maternal Grandfather     Cancer Paternal Grandmother         breast cancer    Diabetes Paternal Grandmother     Cancer Paternal Grandfather         throid    Diabetes Paternal Grandfather     Hypertension Paternal Grandfather     Stroke Paternal Grandfather     Anxiety Mother         diabetes borderline, POTS, fibromyalgia, IBS    Hypertension Brother         etohsim       Social History     Socioeconomic History    Marital status:      Spouse name: Not on file    Number of children: Not on file    Years of education: Not on file    Highest education level: Not on file   Occupational History    Not on file   Social Needs    Financial resource strain: Not on file    Food insecurity:     Worry: Not on file     Inability: Not on file    Transportation needs:     Medical: Not on file     Non-medical: Not on file   Tobacco Use    Smoking status: Never Smoker    Smokeless tobacco: Never Used   Substance and Sexual Activity    Alcohol use: No    Drug use: No    Sexual activity: Yes     Partners: Male     Birth control/protection: None   Lifestyle    Physical activity:     Days per week: Not on file     Minutes per session: Not on file    Stress: Not on file   Relationships    Social connections:     Talks on phone: Not on file     Gets together: Not on file     Attends [employer] service: Not on file     Active member of club or organization: Not on file     Attends meetings of clubs or organizations: Not on file     Relationship status: Not on file    Intimate partner violence:     Fear of current or ex partner: Not on file     Emotionally abused: Not on file     Physically abused: Not on file     Forced sexual activity: Not on file   Other Topics Concern    Not on file   Social History Narrative    Mother of 7 children            15, 8, 11 10,, 5 (adopted) , 5, 2, 2 months     She adopted the 11year old        9 yo diagnosed since daughter was 11years old         ALLERGIES: Gluten and Other medication    Review of Systems   Constitutional: Positive for chills. Negative for fever. HENT: Negative for facial swelling. Eyes: Negative for visual disturbance. Respiratory: Negative for chest tightness. Cardiovascular: Negative for chest pain. Gastrointestinal: Negative for abdominal pain. Genitourinary: Negative for difficulty urinating and dysuria. Musculoskeletal: Positive for arthralgias. Skin: Positive for rash. Neurological: Negative for headaches. Hematological: Negative for adenopathy. Psychiatric/Behavioral: Negative for suicidal ideas. Vitals:    11/08/19 0027   BP: 133/87   Pulse: (!) 102   Resp: 18   Temp: 98.1 °F (36.7 °C)   SpO2: 100%   Weight: 58.9 kg (129 lb 13.6 oz)   Height: 5' 7\" (1.702 m)            Physical Exam   Constitutional: She is oriented to person, place, and time. She appears well-developed and well-nourished. No distress. HENT:   Head: Normocephalic and atraumatic. Mouth/Throat: Oropharynx is clear and moist.   Eyes: Pupils are equal, round, and reactive to light. No scleral icterus. Neck: Normal range of motion. Neck supple. No thyromegaly present. Cardiovascular: Normal rate, regular rhythm, normal heart sounds and intact distal pulses. No murmur heard. Pulmonary/Chest: Effort normal and breath sounds normal. No respiratory distress. Abdominal: Soft. Bowel sounds are normal. She exhibits no distension. There is no tenderness. Musculoskeletal: Normal range of motion. She exhibits no edema. Neurological: She is alert and oriented to person, place, and time. Skin: Skin is warm and dry. No rash noted. She is not diaphoretic. Nursing note and vitals reviewed.        MDM  Number of Diagnoses or Management Options  Acute chest pain:        ED EKG interpretation:  Rhythm: normal sinus rhythm. Rate (approx.): 97. Axis: normal.  ST segment:  No concerning ST elevations or depressions. This EKG was interpreted by Herman Roy MD,ED Provider. Labs unremarkable. Trop changed to poc trop due to lab experiencing \" level is showing error. \"  poc trop neg.    1:46 AM  Patient resting comfortably with no complaints at this time. VS remain stable. Repeat physical exam is unremarkable. Pt ambulatory without difficulty and tolerating po's well.     Procedures

## 2019-11-08 NOTE — DISCHARGE INSTRUCTIONS

## 2019-11-12 ENCOUNTER — OFFICE VISIT (OUTPATIENT)
Dept: CARDIOLOGY CLINIC | Age: 39
End: 2019-11-12

## 2019-11-12 ENCOUNTER — CLINICAL SUPPORT (OUTPATIENT)
Dept: CARDIOLOGY CLINIC | Age: 39
End: 2019-11-12

## 2019-11-12 VITALS
OXYGEN SATURATION: 99 % | BODY MASS INDEX: 19.93 KG/M2 | WEIGHT: 127 LBS | HEIGHT: 67 IN | HEART RATE: 99 BPM | DIASTOLIC BLOOD PRESSURE: 74 MMHG | RESPIRATION RATE: 16 BRPM | SYSTOLIC BLOOD PRESSURE: 116 MMHG

## 2019-11-12 DIAGNOSIS — R06.02 SOB (SHORTNESS OF BREATH): ICD-10-CM

## 2019-11-12 DIAGNOSIS — R07.89 OTHER CHEST PAIN: ICD-10-CM

## 2019-11-12 DIAGNOSIS — R00.0 TACHYCARDIA: ICD-10-CM

## 2019-11-12 DIAGNOSIS — R01.1 MURMUR: ICD-10-CM

## 2019-11-12 DIAGNOSIS — R07.89 OTHER CHEST PAIN: Primary | ICD-10-CM

## 2019-11-12 NOTE — PROGRESS NOTES
HISTORY OF PRESENT ILLNESS  Laurell Brunner is a 41 y.o. female     SUMMARY:   Problem List  Date Reviewed: 11/11/2019          Codes Class Noted    Sepsis ([facility] [address] ICD-10-CM: A41.9  ICD-9-CM: 038.9, 995.91  7/8/2019        Mastitis ICD-10-CM: N61.0  ICD-9-CM: 611.0  7/6/2019        Irritable bowel syndrome without diarrhea ICD-10-CM: K58.9  ICD-9-CM: 564.1  6/19/2018        Normal delivery ICD-10-CM: O80  ICD-9-CM: 963  2/23/2018        Rh negative status during pregnancy ICD-10-CM: O26.899, Z67.91  ICD-9-CM: 646.83  9/22/2013              Current Outpatient Medications on File Prior to Visit   Medication Sig    Omega-3 Fatty Acids (FISH OIL) 500 mg cap Take  by mouth. Pt unsure of dose    iodine (KELP, IODINE,) 150 mcg tab Take 1 Tab by mouth daily. (Patient taking differently: Take 1 Tab by mouth as needed.)    ondansetron hcl (ZOFRAN) 4 mg tablet Take 1 Tab by mouth every eight (8) hours as needed for Nausea. (Patient taking differently: Take 4 mg by mouth every eight (8) hours as needed for Nausea. Indications: not taking)    cyanocobalamin (VITAMIN B-12) 500 mcg tablet Take 500 mcg by mouth daily.  B.infantis-B.ani-B.long-B.bifi (PROBIOTIC 4X) 10-15 mg TbEC Take  by mouth.  ergocalciferol, vitamin D2, (VITAMIN D2 PO) Take  by mouth. Indications: not taking     No current facility-administered medications on file prior to visit. CARDIOLOGY STUDIES TO DATE:  No specialty comments available. Chief Complaint   Patient presents with    Chest Pain     HPI :  Ms. Sandy Bui is a 41year-old referred by her rheumatologist for evaluation of a recently noted murmur. She has had a bunch of strange symptoms for the last four or five months with joint pain, elevated heart rate, episodes of tachycardia, shortness of breath and more recently some pleuritic type chest pains as well.   There is some question that she may have Lyme disease and that is being evaluated and because of all this she recently saw her rheumatologist who heard a murmur. Interestingly, back in 2004, she was told she had mitral valve prolapse, but a few years later, she had a follow-up echocardiogram and was told everything was okay. 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 Her weight is stable. She drinks no caffeine or alcohol. There is no history of hypertension or diabetes. Cholesterol has been good. She has never smoked. Family history is negative for premature coronary disease. She recently ended up in the emergency room with this atypical chest pain where she had an EKG that showed sinus rhythm and questionable biatrial enlargement. She has some mild arthralgias and occasional paraesthesias in her fingers.         CARDIAC ROS:   negative for syncope, orthopnea, paroxysmal nocturnal dyspnea, exertional chest pressure/discomfort, claudication, lower extremity edema    Family History   Problem Relation Age of Onset    Cancer Father         melanoma and prostatate    Elevated Lipids Father     Hypertension Father     Diabetes Father     Cancer Maternal Grandmother         uterine    Hypertension Maternal Grandmother     Heart Disease Maternal Grandmother     Cancer Maternal Grandfather         brain cancer    Heart Disease Maternal Grandfather     Hypertension Maternal Grandfather     Cancer Paternal Grandmother         breast cancer    Diabetes Paternal Grandmother     Cancer Paternal Grandfather         throid    Diabetes Paternal Grandfather     Hypertension Paternal Grandfather     Stroke Paternal Grandfather     Anxiety Mother         diabetes borderline, POTS, fibromyalgia, IBS    Hypertension Brother         etohsim       Past Medical History:   Diagnosis Date    Anemia 2015    taking iron    Complication of anesthesia     severe vomiting; requires antinausea medication first    Disease of blood and blood forming organ     Irritable bowel syndrome without diarrhea     colonoscopy 2011 Dr Connell Late.  she is gluten-free    Kidney disease 2018    kidney stones    Psychiatric problem 2015    anxiety, panic attack after last delivery    Rh negative status during pregnancy 9/22/2013    rhogham given 6/22/2015    Thrombocytopenia ([address]     last labs plt were 118    Thyroid activity decreased 2015    tested multiple times, continued to improve through pregnancy, on no meds at this time       GENERAL ROS:  A comprehensive review of systems was negative except for that written in the HPI. Visit Vitals  /74 (BP 1 Location: Right arm, BP Patient Position: Sitting)   Pulse 99   Resp 16   Ht 5' 7\" (1.702 m)   Wt 127 lb (57.6 kg)   LMP 10/22/2019 (Approximate)   SpO2 99%   BMI 19.89 kg/m²       Wt Readings from Last 3 Encounters:   11/12/19 127 lb (57.6 kg)   11/08/19 129 lb 13.6 oz (58.9 kg)   09/13/19 126 lb 6 oz (57.3 kg)            BP Readings from Last 3 Encounters:   11/12/19 116/74   11/08/19 116/79   09/13/19 115/80       PHYSICAL EXAM  General appearance: alert, cooperative, no distress, appears stated age  Neurologic: Alert and oriented X 3  Neck: supple, symmetrical, trachea midline, no adenopathy, no carotid bruit and no JVD  Lungs: clear to auscultation bilaterally  Heart: regular rate and rhythm, S1, S2 normal, 1/6 sys murmur lusb, no click, rub or gallop  Abdomen: soft, non-tender.  Bowel sounds normal. No masses,  no organomegaly  Extremities: extremities normal, atraumatic, no cyanosis or edema  Pulses: 2+ and symmetric    Lab Results   Component Value Date/Time    Cholesterol, total 155 05/16/2019 10:34 AM    Cholesterol, total 164 05/10/2018 02:11 PM    HDL Cholesterol 68 05/16/2019 10:34 AM    HDL Cholesterol 63 05/10/2018 02:11 PM    LDL, calculated 74 05/16/2019 10:34 AM    LDL, calculated 87 05/10/2018 02:11 PM    Triglyceride 66 05/16/2019 10:34 AM    Triglyceride 71 05/10/2018 02:11 PM     ASSESSMENT  Ms. Sandy Bui has no real risk factors and her chest pain sounds quite atypical and I do not think it is cardiac. The question of a murmur has come up and with these episodes of tachycardia, I think she needs further evaluation with an echocardiogram and an event monitor. current treatment plan is effective, no change in therapy  lab results and schedule of future lab studies reviewed with patient  reviewed diet, exercise and weight control    Encounter Diagnoses   Name Primary?  Other chest pain Yes    SOB (shortness of breath)     Murmur     Tachycardia      Orders Placed This Encounter    Omega-3 Fatty Acids (FISH OIL) 500 mg cap       Follow-up and Dispositions    · Return in about 4 weeks (around 12/10/2019).          Shailesh Rosas MD  11/12/2019

## 2019-11-14 ENCOUNTER — TELEPHONE (OUTPATIENT)
Dept: CARDIOLOGY CLINIC | Age: 39
End: 2019-11-14

## 2019-11-14 NOTE — TELEPHONE ENCOUNTER
----- Message from Beverly Pruett MD sent at 11/14/2019  5:16 PM EST -----  Heart muscle is strong and valves all ok, looks great

## 2019-11-14 NOTE — TELEPHONE ENCOUNTER
Called patient. Verified patient's identity with two identifiers. Notified patient of echo results. Patient verbalized understanding and denied further questions or concerns.

## 2020-01-03 ENCOUNTER — TELEPHONE (OUTPATIENT)
Dept: CARDIOLOGY CLINIC | Age: 40
End: 2020-01-03

## 2020-01-28 ENCOUNTER — OFFICE VISIT (OUTPATIENT)
Dept: NEUROLOGY | Age: 40
End: 2020-01-28

## 2020-01-28 ENCOUNTER — HOSPITAL ENCOUNTER (OUTPATIENT)
Dept: GENERAL RADIOLOGY | Age: 40
Discharge: HOME | End: 2020-01-28
Payer: COMMERCIAL

## 2020-01-28 VITALS
DIASTOLIC BLOOD PRESSURE: 70 MMHG | SYSTOLIC BLOOD PRESSURE: 110 MMHG | BODY MASS INDEX: 19.93 KG/M2 | HEART RATE: 83 BPM | OXYGEN SATURATION: 99 % | HEIGHT: 67 IN | WEIGHT: 127 LBS

## 2020-01-28 DIAGNOSIS — R41.82 ALTERED MENTAL STATUS, UNSPECIFIED ALTERED MENTAL STATUS TYPE: ICD-10-CM

## 2020-01-28 DIAGNOSIS — G60.9 IDIOPATHIC SMALL FIBER PERIPHERAL NEUROPATHY: ICD-10-CM

## 2020-01-28 DIAGNOSIS — G89.29 CHRONIC BILATERAL THORACIC BACK PAIN: ICD-10-CM

## 2020-01-28 DIAGNOSIS — R56.9 CONVULSIONS, UNSPECIFIED CONVULSION TYPE (HCC): ICD-10-CM

## 2020-01-28 DIAGNOSIS — R20.0 NUMBNESS AND TINGLING OF BOTH FEET: ICD-10-CM

## 2020-01-28 DIAGNOSIS — G60.9 IDIOPATHIC SMALL FIBER PERIPHERAL NEUROPATHY: Primary | ICD-10-CM

## 2020-01-28 DIAGNOSIS — M54.6 CHRONIC BILATERAL THORACIC BACK PAIN: ICD-10-CM

## 2020-01-28 DIAGNOSIS — R20.2 NUMBNESS AND TINGLING OF BOTH FEET: ICD-10-CM

## 2020-01-28 DIAGNOSIS — N61.0 MASTITIS: ICD-10-CM

## 2020-01-28 DIAGNOSIS — R20.2 NUMBNESS AND TINGLING OF BOTH UPPER EXTREMITIES: ICD-10-CM

## 2020-01-28 DIAGNOSIS — R20.0 NUMBNESS AND TINGLING OF BOTH UPPER EXTREMITIES: ICD-10-CM

## 2020-01-28 DIAGNOSIS — E03.4 HYPOTHYROIDISM DUE TO ACQUIRED ATROPHY OF THYROID: ICD-10-CM

## 2020-01-28 PROCEDURE — 72072 X-RAY EXAM THORAC SPINE 3VWS: CPT

## 2020-01-28 PROCEDURE — 72052 X-RAY EXAM NECK SPINE 6/>VWS: CPT

## 2020-01-28 NOTE — LETTER
1/28/20 Patient: Joya Jones YOB: 1980 Date of Visit: 1/28/2020 Ludin Hanley MD 
[address] [facility] [address] [place] [id number] VIA In Basket Dear Ludin Hanley MD, Thank you for referring Ms. Joya Jones to [address] [place] [address] for evaluation. My notes for this consultation are attached. Consult REFERRED BY: 
Daisha House MD 
 
CHIEF COMPLAINT: Headaches, numbness, pain, rashes, altered mental status, loss of balance and coordination and loss of speech, and multiplicity of neurological symptoms and problems. Subjective:  
 
Joya Jones is a 42 y.o. right-handed  female seen as a new patient to me, at the request of Dr. Jaelyn Lucas. For evaluation of new problem of a multiplicity of neurological symptoms that began in the summer. The patient states that she had mastitis and sepsis related to breast-feeding and the mastitis, and possibly a tick bite in the same area, had to be treated with antibiotics admitted to the hospital at [facility] and was found given Keflex with improvement in her condition after IV antibiotics were given. The patient began developing tingling in her feet, pain all over in her joints, some mouth tingling and numbness, sensation of loss of balance that comes and goes, difficulty concentrating, sometimes lasting for days, with altered mental status and slowness, difficulty closing her hands because her knuckles and feet were swollen, and she saw a rheumatologist Dr. Tawnya Keenan who did multiple connective tissue tests and no abnormality was found. She had a Lyme's disease test, ALONA, sed rate, arthritis test, all negative.   The patient apparently saw another neurologist around December I think, and had an MRI of the brain done at [facility] that was also normal.  She was concerned about MS but does not have any evidence of MS on her MRI. Dr. Yuli Delgado did not really know what was causing her problem but thought may be stress. She does have 7 children ages 1-13. She saw a specialist at [facility] who apparently specializes in Lyme's disease and even though her titer was negative found a few bands that might suggest some exposure, but I told her you have to have at least 5 bands positive because everybody has a couple of bands that are positive. The numbness and burning pain in her hands and feet and elbows comes and goes without clear rhyme or reason and she still has the intermittent tingling and burning in her mouth also, has trembling at times and myoclonic jerks when she goes to sleep and sometimes just sitting watching TV. She says she sleeps pretty well and does not have any sleep apnea or sleep deprivation and denies any increased anxiety or stress. She does complain of some neck pain, but no lumbar pain but does have apparently some tailbone pain that may be related to her coccyx being a little abnormal and she is already seen an orthopedic surgeon for this who just recommends anti-inflammatories and physical therapy. X-rays of her back apparently were unremarkable as were her hips. She complains of some generalized weakness at times also. She has no family history of similar problems and had no prior history of this before her mastitis and sepsis. She has had no recent fever, and overall is a little bit better but still symptomatic and wants to know if we can help her in any way. She does not really want to take medication. Takes fish oil, probiotics, vitamin D we recommended a One-A-Day vitamin also. 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 We will try to get her records also from Dr. Jasmin Esquivel.   He has spells of confusion that sometimes last for days where she loses balance, has no coordination, loses her speech, seems to come and go, and then has the myoclonic jerks that come and go also. He is never had a seizure or any other problems before. Past Medical History:  
Diagnosis Date  Anemia 2015  
 taking iron  Complication of anesthesia   
 severe vomiting; requires antinausea medication first  
 Disease of blood and blood forming organ  Irritable bowel syndrome without diarrhea   
 colonoscopy 2011 Dr DE LA ROSA/[employer].  she is gluten-free  Kidney disease 2018  
 kidney stones  Psychiatric problem 2015  
 anxiety, panic attack after last delivery  Rh negative status during pregnancy 9/22/2013  
 rhogham given 6/22/2015  Thrombocytopenia ([address]   
 last labs plt were 118  Thyroid activity decreased 2015  
 tested multiple times, continued to improve through pregnancy, on no meds at this time Past Surgical History:  
Procedure Laterality Date  HX COLONOSCOPY Dr DEL A ROSA/[employer]  HX OTHER SURGICAL    
 wisdom, echo to check for murmur  HX WISDOM TEETH EXTRACTION Family History Problem Relation Age of Onset  Cancer Father   
     melanoma and prostatate  Elevated Lipids Father  Hypertension Father  Diabetes Father  Cancer Maternal Grandmother   
     uterine  Hypertension Maternal Grandmother  Heart Disease Maternal Grandmother  Cancer Maternal Grandfather   
     brain cancer  Heart Disease Maternal Grandfather  Hypertension Maternal Grandfather  Cancer Paternal Grandmother   
     breast cancer  Diabetes Paternal Grandmother  Cancer Paternal Grandfather   
     throid  Diabetes Paternal Grandfather  Hypertension Paternal Grandfather  Stroke Paternal Grandfather  Anxiety Mother   
     diabetes borderline, POTS, fibromyalgia, IBS  Thyroid Disease Mother Magdalena Harveya Other Mother POTS,DYSAUTONOMIA  Hypertension Brother   
     etohsim  Other Child PANS  Eczema Child Social History Tobacco Use  Smoking status: Never Smoker  Smokeless tobacco: Never Used Substance Use Topics  Alcohol use: No  
   
 
Current Outpatient Medications:   Omega-3 Fatty Acids (FISH OIL) 500 mg cap, Take  by mouth. Pt unsure of dose, Disp: , Rfl:  
  cyanocobalamin (VITAMIN B-12) 500 mcg tablet, Take 500 mcg by mouth daily. , Disp: , Rfl:  
  B.infantis-B.ani-B.long-B.bifi (PROBIOTIC 4X) 10-15 mg TbEC, Take  by mouth., Disp: , Rfl:  
  ergocalciferol, vitamin D2, (VITAMIN D2 PO), Take  by mouth. Indications: not taking, Disp: , Rfl:  
 
 
 
Allergies Allergen Reactions  Gluten Diarrhea  Other Medication Nausea and Vomiting Patient states that she is intolerant of general anesthesia and requires that an anti-nausea medication be given first.   
  
MRI Results (most recent): No results found for this or any previous visit. No results found for this or any previous visit. Review of Systems: A comprehensive review of systems was negative except for: Constitutional: positive for fatigue and malaise Eyes: positive for visual disturbance Musculoskeletal: positive for myalgias, arthralgias, stiff joints, neck pain, back pain and muscle weakness Neurological: positive for dizziness, seizures, memory problems, paresthesia, coordination problems, gait problems, tremor and weakness Behvioral/Psych: positive for anxiety and depression Vitals:  
 01/28/20 7531 BP: 110/70 Pulse: 83 SpO2: 99% Weight: 127 lb (57.6 kg) Height: 5' 7\" (1.702 m) Objective: I 
 
 
NEUROLOGICAL EXAM: 
 
Appearance: The patient is thinly  developed, and nourished, provides a coherent history and is in no acute distress.   
Mental Status: Oriented to time, place and person, and the president, cognitive function is normal and speech is fluent and no aphasia or dysarthria. Mood and affect appropriate but anxious and slightly depressed. Cranial Nerves:   Intact visual fields. Fundi are benign, disc are flat, no lesions seen on funduscopy. OTTO, EOM's full, no nystagmus, no ptosis. Facial sensation is normal. Corneal reflexes are not tested. Facial movement is symmetric. Hearing is normal bilaterally. Palate is midline with normal sternocleidomastoid and trapezius muscles are normal. Tongue is midline. Neck without meningismus or bruits Temporal arteries are not tender or enlarged TMJ areas are not tender on palpation Motor:  5/5 strength in upper and lower proximal and distal muscles. Normal bulk and tone. No fasciculations. Rapid alternating movement is symmetric and intact bilaterally Reflexes:   Deep tendon reflexes 2+/4 and symmetrical. 
No babinski or clonus present Sensory:   Normal to touch, pinprick and vibration and temperature. DSS is intact Gait:  Normal gait for patient's age. Tremor:   No tremor noted. Cerebellar:  No abnormal cerebellar signs present on Romberg and tandem testing and finger-nose-finger exam.  
Neurovascular:  Normal heart sounds and regular rhythm, peripheral pulses intact, and no carotid bruits. Assessment: ICD-10-CM ICD-9-CM 1. Idiopathic small fiber peripheral neuropathy G60.9 356.8 NEURO EEG 24 HR  
   EMG LIMITED ANTINEURONAL CELL AB  
   XR SPINE THORAC 3 V  
   XR SPINE CERV W OBL/FLEX/EXT MIN 6 V COMP IMMUNOELECTROPHORESIS (IMMUNOFIX.)  
   GM1 IGG AUTOANTIBODY  
   SED RATE (ESR) CBC WITH AUTOMATED DIFF  
   MISC. LAB TEST 2. Convulsions, unspecified convulsion [facility] R56.9 780.39 NEURO EEG 24 HR  
   EMG LIMITED ANTINEURONAL CELL AB  
   XR SPINE THORAC 3 V  
   XR SPINE CERV W OBL/FLEX/EXT MIN 6 V COMP IMMUNOELECTROPHORESIS (IMMUNOFIX.)  
   GM1 IGG AUTOANTIBODY  
   SED RATE (ESR) CBC WITH AUTOMATED DIFF  
   MISC.  LAB TEST  
 3. Altered mental status, unspecified altered mental status type R41.82 780.97 NEURO EEG 24 HR  
   EMG LIMITED ANTINEURONAL CELL AB  
   XR SPINE THORAC 3 V  
   XR SPINE CERV W OBL/FLEX/EXT MIN 6 V COMP IMMUNOELECTROPHORESIS (IMMUNOFIX.)  
   GM1 IGG AUTOANTIBODY  
   SED RATE (ESR) CBC WITH AUTOMATED DIFF  
   MISC. LAB TEST 4. Hypothyroidism due to acquired atrophy of thyroid E03.4 244.8 NEURO EEG 24 HR  
  246.8 EMG LIMITED ANTINEURONAL CELL AB  
   XR SPINE THORAC 3 V  
   XR SPINE CERV W OBL/FLEX/EXT MIN 6 V COMP IMMUNOELECTROPHORESIS (IMMUNOFIX.)  
   GM1 IGG AUTOANTIBODY  
   SED RATE (ESR) CBC WITH AUTOMATED DIFF  
   MISC. LAB TEST 5. Mastitis N61.0 611.0 NEURO EEG 24 HR  
   EMG LIMITED ANTINEURONAL CELL AB  
   XR SPINE THORAC 3 V  
   XR SPINE CERV W OBL/FLEX/EXT MIN 6 V COMP IMMUNOELECTROPHORESIS (IMMUNOFIX.)  
   GM1 IGG AUTOANTIBODY  
   SED RATE (ESR) CBC WITH AUTOMATED DIFF  
   MISC. LAB TEST 6. Chronic bilateral thoracic back pain M54.6 724.1 NEURO EEG 24 HR  
 G89.29 338.29 EMG LIMITED ANTINEURONAL CELL AB  
   XR SPINE THORAC 3 V  
   XR SPINE CERV W OBL/FLEX/EXT MIN 6 V COMP IMMUNOELECTROPHORESIS (IMMUNOFIX.)  
   GM1 IGG AUTOANTIBODY  
   SED RATE (ESR) CBC WITH AUTOMATED DIFF  
   MISC. LAB TEST 7. Numbness and tingling of both feet R20.0 782.0 NEURO EEG 24 HR  
 R20.2  EMG LIMITED ANTINEURONAL CELL AB  
   XR SPINE THORAC 3 V  
   XR SPINE CERV W OBL/FLEX/EXT MIN 6 V COMP IMMUNOELECTROPHORESIS (IMMUNOFIX.)  
   GM1 IGG AUTOANTIBODY  
   SED RATE (ESR) CBC WITH AUTOMATED DIFF  
   MISC. LAB TEST 8. Numbness and tingling of both upper extremities R20.0 782.0 NEURO EEG 24 HR  
 R20.2  EMG LIMITED ANTINEURONAL CELL AB  
   XR SPINE THORAC 3 V  
   XR SPINE CERV W OBL/FLEX/EXT MIN 6 V COMP IMMUNOELECTROPHORESIS (IMMUNOFIX.)  
   GM1 IGG AUTOANTIBODY  
   SED RATE (ESR)    CBC WITH AUTOMATED DIFF  
 MISC. LAB TEST Active Problems: * No active hospital problems. * 
 
 
Plan:  
 
Patient with a multiplicity of neurological symptoms that are very hard to explain with one unifying diagnosis or clinical diagnosis with a normal MRI of the brain, and basically a normal neurologic and physical examination. We will check metabolic parameters looking for any cause of neuropathy, or autoimmune disease, and check an EMG and nerve conduction study of the arms and legs to make sure there is no evidence of neuropathy, myopathy, or other neuromuscular disease. We will also check an ambulatory 24-hour EEG recording to make sure she not having any type of seizure disorder in view of all the myoclonic jerks, spells of altered mental status, confusion loss of speech, loss of balance coordination, but she is never really had a clear generalized convulsion. We will try to get her records from Dr. Joyce Can but we reviewed all her x-rays and lab tests on the chart and they look relatively unremarkable. We encourage her to stay mentally and physically active, work with her orthopedist, work with her physical therapist, eat a good diet, take her vitamins, and check my chart for results of her tests and further treatment evaluation will depend on the results of her tests and clinical course and response to therapy. She does not want any medication at this time so no medications given. Signed By: Vlad Hermosillo MD   
 January 28, 2020 CC: Lane Ny MD 
FAX: [phone] This note will not be viewable in [address] If you have questions, please do not hesitate to call me. I look forward to following your patient along with you. Sincerely, Vlad Hermosillo MD

## 2020-01-28 NOTE — PATIENT INSTRUCTIONS

## 2020-01-29 ENCOUNTER — DOCUMENTATION ONLY (OUTPATIENT)
Dept: NEUROLOGY | Age: 40
End: 2020-01-29

## 2020-01-29 NOTE — PROGRESS NOTES
Called the patient, her platelets were low and her white count was a little low, she had the same problem with pregnancy is already seen a hematologist before, we suggested she go back to the hematologist and get checked again

## 2020-01-29 NOTE — PROGRESS NOTES
Consult  REFERRED BY:  Zaina Atkinson MD    CHIEF COMPLAINT: Headaches, numbness, pain, rashes, altered mental status, loss of balance and coordination and loss of speech, and multiplicity of neurological symptoms and problems. Subjective:     Ramírez Way is a 42 y.o. right-handed  female seen as a new patient to me, at the request of Dr. Elias Ortiz. For evaluation of new problem of a multiplicity of neurological symptoms that began in the summer. The patient states that she had mastitis and sepsis related to breast-feeding and the mastitis, and possibly a tick bite in the same area, had to be treated with antibiotics admitted to the hospital at [address] [place] [address] and was found given Keflex with improvement in her condition after IV antibiotics were given. The patient began developing tingling in her feet, pain all over in her joints, some mouth tingling and numbness, sensation of loss of balance that comes and goes, difficulty concentrating, sometimes lasting for days, with altered mental status and slowness, difficulty closing her hands because her knuckles and feet were swollen, and she saw a rheumatologist Dr. Arsalan Mckeon who did multiple connective tissue tests and no abnormality was found. She had a Lyme's disease test, ALONA, sed rate, arthritis test, all negative. The patient apparently saw another neurologist around December I think, and had an MRI of the brain done at [facility] that was also normal.  She was concerned about MS but does not have any evidence of MS on her MRI. Dr. Andi Tyler did not really know what was causing her problem but thought may be stress. She does have 7 children ages 1-13.   She saw a specialist at [facility] who apparently specializes in Lyme's disease and even though her titer was negative found a few bands that might suggest some exposure, but I told her you have to have at least 5 bands positive because everybody has a couple of bands that are positive. The numbness and burning pain in her hands and feet and elbows comes and goes without clear rhyme or reason and she still has the intermittent tingling and burning in her mouth also, has trembling at times and myoclonic jerks when she goes to sleep and sometimes just sitting watching TV. She says she sleeps pretty well and does not have any sleep apnea or sleep deprivation and denies any increased anxiety or stress. She does complain of some neck pain, but no lumbar pain but does have apparently some tailbone pain that may be related to her coccyx being a little abnormal and she is already seen an orthopedic surgeon for this who just recommends anti-inflammatories and physical therapy. X-rays of her back apparently were unremarkable as were her hips. She complains of some generalized weakness at times also. She has no family history of similar problems and had no prior history of this before her mastitis and sepsis. She has had no recent fever, and overall is a little bit better but still symptomatic and wants to know if we can help her in any way. She does not really want to take medication. Takes fish oil, probiotics, vitamin D we recommended a One-A-Day vitamin also. 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 We will try to get her records also from Dr. Bria Castillo. He has spells of confusion that sometimes last for days where she loses balance, has no coordination, loses her speech, seems to come and go, and then has the myoclonic jerks that come and go also. He is never had a seizure or any other problems before.     Past Medical History:   Diagnosis Date    Anemia 2015    taking iron    Complication of anesthesia     severe vomiting; requires antinausea medication first    Disease of blood and blood forming organ     Irritable bowel syndrome without diarrhea     colonoscopy 2011 Dr Jose Agee.  she is gluten-free    Kidney disease 2018    kidney stones    Psychiatric problem 2015    anxiety, panic attack after last delivery    Rh negative status during pregnancy 9/22/2013    rhogham given 6/22/2015    Thrombocytopenia ([facility] [address]     last labs plt were 118    Thyroid activity decreased 2015    tested multiple times, continued to improve through pregnancy, on no meds at this time      Past Surgical History:   Procedure Laterality Date    HX COLONOSCOPY      Dr Debra Chisholm HX OTHER SURGICAL      wisdom, echo to check for murmur     HX WISDOM TEETH EXTRACTION       Family History   Problem Relation Age of Onset    Cancer Father         melanoma and prostatate    Elevated Lipids Father     Hypertension Father     Diabetes Father     Cancer Maternal Grandmother         uterine    Hypertension Maternal Grandmother     Heart Disease Maternal Grandmother     Cancer Maternal Grandfather         brain cancer    Heart Disease Maternal Grandfather     Hypertension Maternal Grandfather     Cancer Paternal Grandmother         breast cancer    Diabetes Paternal Grandmother     Cancer Paternal Grandfather         throid    Diabetes Paternal Grandfather     Hypertension Paternal Grandfather     Stroke Paternal Grandfather     Anxiety Mother         diabetes borderline, POTS, fibromyalgia, IBS    Thyroid Disease Mother     Other Mother         POTS,DYSAUTONOMIA    Hypertension Brother         etohsim    Other Child         PANS    Eczema Child       Social History     Tobacco Use    Smoking status: Never Smoker    Smokeless tobacco: Never Used   Substance Use Topics    Alcohol use: No         Current Outpatient Medications:     Omega-3 Fatty Acids (FISH OIL) 500 mg cap, Take  by mouth. Pt unsure of dose, Disp: , Rfl:     cyanocobalamin (VITAMIN B-12) 500 mcg tablet, Take 500 mcg by mouth daily. , Disp: , Rfl:     B.infantis-B.ani-B.long-B.bifi (PROBIOTIC 4X) 10-15 mg TbEC, Take  by mouth., Disp: , Rfl:     ergocalciferol, vitamin D2, (VITAMIN D2 PO), Take  by mouth. Indications: not taking, Disp: , Rfl:         Allergies   Allergen Reactions    Gluten Diarrhea    Other Medication Nausea and Vomiting     Patient states that she is intolerant of general anesthesia and requires that an anti-nausea medication be given first.       MRI Results (most recent):  No results found for this or any previous visit. No results found for this or any previous visit. Review of Systems:  A comprehensive review of systems was negative except for: Constitutional: positive for fatigue and malaise  Eyes: positive for visual disturbance  Musculoskeletal: positive for myalgias, arthralgias, stiff joints, neck pain, back pain and muscle weakness  Neurological: positive for dizziness, seizures, memory problems, paresthesia, coordination problems, gait problems, tremor and weakness  Behvioral/Psych: positive for anxiety and depression   Vitals:    01/28/20 0806   BP: 110/70   Pulse: 83   SpO2: 99%   Weight: 127 lb (57.6 kg)   Height: 5' 7\" (1.702 m)     Objective:     I      NEUROLOGICAL EXAM:    Appearance: The patient is thinly  developed, and nourished, provides a coherent history and is in no acute distress. Mental Status: Oriented to time, place and person, and the president, cognitive function is normal and speech is fluent and no aphasia or dysarthria. Mood and affect appropriate but anxious and slightly depressed. Cranial Nerves:   Intact visual fields. Fundi are benign, disc are flat, no lesions seen on funduscopy. OTTO, EOM's full, no nystagmus, no ptosis. Facial sensation is normal. Corneal reflexes are not tested. Facial movement is symmetric. Hearing is normal bilaterally. Palate is midline with normal sternocleidomastoid and trapezius muscles are normal. Tongue is midline.   Neck without meningismus or bruits  Temporal arteries are not tender or enlarged  TMJ areas are not tender on palpation   Motor:  5/5 strength in upper and lower proximal and distal muscles. Normal bulk and tone. No fasciculations. Rapid alternating movement is symmetric and intact bilaterally   Reflexes:   Deep tendon reflexes 2+/4 and symmetrical.  No babinski or clonus present   Sensory:   Normal to touch, pinprick and vibration and temperature. DSS is intact   Gait:  Normal gait for patient's age. Tremor:   No tremor noted. Cerebellar:  No abnormal cerebellar signs present on Romberg and tandem testing and finger-nose-finger exam.   Neurovascular:  Normal heart sounds and regular rhythm, peripheral pulses intact, and no carotid bruits. Assessment:       ICD-10-CM ICD-9-CM    1. Idiopathic small fiber peripheral neuropathy G60.9 356.8 NEURO EEG 24 HR      EMG LIMITED      ANTINEURONAL CELL AB      XR SPINE THORAC 3 V      XR SPINE CERV W OBL/FLEX/EXT MIN 6 V COMP      IMMUNOELECTROPHORESIS (IMMUNOFIX.)      GM1 IGG AUTOANTIBODY      SED RATE (ESR)      CBC WITH AUTOMATED DIFF      MISC. LAB TEST   2. Convulsions, unspecified convulsion type ([facility] [address] R56.9 780.39 NEURO EEG 24 HR      EMG LIMITED      ANTINEURONAL CELL AB      XR SPINE THORAC 3 V      XR SPINE CERV W OBL/FLEX/EXT MIN 6 V COMP      IMMUNOELECTROPHORESIS (IMMUNOFIX.)      GM1 IGG AUTOANTIBODY      SED RATE (ESR)      CBC WITH AUTOMATED DIFF      MISC. LAB TEST   3. Altered mental status, unspecified altered mental status type R41.82 780.97 NEURO EEG 24 HR      EMG LIMITED      ANTINEURONAL CELL AB      XR SPINE THORAC 3 V      XR SPINE CERV W OBL/FLEX/EXT MIN 6 V COMP      IMMUNOELECTROPHORESIS (IMMUNOFIX.)      GM1 IGG AUTOANTIBODY      SED RATE (ESR)      CBC WITH AUTOMATED DIFF      MISC. LAB TEST   4.  Hypothyroidism due to acquired atrophy of thyroid E03.4 244.8 NEURO EEG 24 HR     246.8 EMG LIMITED      ANTINEURONAL CELL AB      XR SPINE THORAC 3 V      XR SPINE CERV W OBL/FLEX/EXT MIN 6 V COMP      IMMUNOELECTROPHORESIS (IMMUNOFIX.) GM1 IGG AUTOANTIBODY      SED RATE (ESR)      CBC WITH AUTOMATED DIFF      MISC. LAB TEST   5. Mastitis N61.0 611.0 NEURO EEG 24 HR      EMG LIMITED      ANTINEURONAL CELL AB      XR SPINE THORAC 3 V      XR SPINE CERV W OBL/FLEX/EXT MIN 6 V COMP      IMMUNOELECTROPHORESIS (IMMUNOFIX.)      GM1 IGG AUTOANTIBODY      SED RATE (ESR)      CBC WITH AUTOMATED DIFF      MISC. LAB TEST   6. Chronic bilateral thoracic back pain M54.6 724.1 NEURO EEG 24 HR    G89.29 338.29 EMG LIMITED      ANTINEURONAL CELL AB      XR SPINE THORAC 3 V      XR SPINE CERV W OBL/FLEX/EXT MIN 6 V COMP      IMMUNOELECTROPHORESIS (IMMUNOFIX.)      GM1 IGG AUTOANTIBODY      SED RATE (ESR)      CBC WITH AUTOMATED DIFF      MISC. LAB TEST   7. Numbness and tingling of both feet R20.0 782.0 NEURO EEG 24 HR    R20.2  EMG LIMITED      ANTINEURONAL CELL AB      XR SPINE THORAC 3 V      XR SPINE CERV W OBL/FLEX/EXT MIN 6 V COMP      IMMUNOELECTROPHORESIS (IMMUNOFIX.)      GM1 IGG AUTOANTIBODY      SED RATE (ESR)      CBC WITH AUTOMATED DIFF      MISC. LAB TEST   8. Numbness and tingling of both upper extremities R20.0 782.0 NEURO EEG 24 HR    R20.2  EMG LIMITED      ANTINEURONAL CELL AB      XR SPINE THORAC 3 V      XR SPINE CERV W OBL/FLEX/EXT MIN 6 V COMP      IMMUNOELECTROPHORESIS (IMMUNOFIX.)      GM1 IGG AUTOANTIBODY      SED RATE (ESR)      CBC WITH AUTOMATED DIFF      MISC. LAB TEST     Active Problems:    * No active hospital problems. *      Plan:     Patient with a multiplicity of neurological symptoms that are very hard to explain with one unifying diagnosis or clinical diagnosis with a normal MRI of the brain, and basically a normal neurologic and physical examination. We will check metabolic parameters looking for any cause of neuropathy, or autoimmune disease, and check an EMG and nerve conduction study of the arms and legs to make sure there is no evidence of neuropathy, myopathy, or other neuromuscular disease.   We will also check an ambulatory 24-hour EEG recording to make sure she not having any type of seizure disorder in view of all the myoclonic jerks, spells of altered mental status, confusion loss of speech, loss of balance coordination, but she is never really had a clear generalized convulsion. We will try to get her records from Dr. Lizz Chatman but we reviewed all her x-rays and lab tests on the chart and they look relatively unremarkable. We encourage her to stay mentally and physically active, work with her orthopedist, work with her physical therapist, eat a good diet, take her vitamins, and check my chart for results of her tests and further treatment evaluation will depend on the results of her tests and clinical course and response to therapy. She does not want any medication at this time so no medications given. Signed By: Robin Lerma MD     January 28, 2020       CC: Park Berrios MD  FAX: [phone]    This note will not be viewable in [address]

## 2020-01-31 ENCOUNTER — TELEPHONE (OUTPATIENT)
Dept: NEUROLOGY | Age: 40
End: 2020-01-31

## 2020-01-31 NOTE — TELEPHONE ENCOUNTER
----- Message from Fercho Westbrook sent at 1/31/2020  3:05 PM EST -----  Regarding: dr stout/ telephone  General Message/Vendor Calls    Caller's first and last name: pt      Reason for call: she received a call saying she could print her lab results off MyChart but they arent on there and she needs those for an appt on Monday       Callback required yes/no and why:      Best contact number(s): [phone]      Details to clarify the request:      Puma Chow

## 2020-02-03 ENCOUNTER — TELEPHONE (OUTPATIENT)
Dept: NEUROLOGY | Age: 40
End: 2020-02-03

## 2020-02-04 LAB
Lab: <444 BTU (ref 0–999)
MAG IGM AUTO-AB INTERPRETATION: NORMAL

## 2020-02-05 NOTE — TELEPHONE ENCOUNTER
I called the patient and all is complete for her.  I notified there are a few that are not complete and she will call back in about a week to check on them

## 2020-02-06 LAB
ANTI NEURONAL CELL AB METHOD: NORMAL
ANTI NEURONAL CELL AB, 811986: 10 UNITS (ref 0–54)
BASOPHILS # BLD AUTO: 0 X10E3/UL (ref 0–0.2)
BASOPHILS NFR BLD AUTO: 1 %
EOSINOPHIL # BLD AUTO: 0.1 X10E3/UL (ref 0–0.4)
EOSINOPHIL NFR BLD AUTO: 5 %
ERYTHROCYTE [DISTWIDTH] IN BLOOD BY AUTOMATED COUNT: 13 % (ref 11.7–15.4)
ERYTHROCYTE [SEDIMENTATION RATE] IN BLOOD BY WESTERGREN METHOD: 4 MM/HR (ref 0–32)
GM1 GANGL IGG TITR SER IA: 51 % (ref 0–30)
HCT VFR BLD AUTO: 34.2 % (ref 34–46.6)
HGB BLD-MCNC: 11.6 G/DL (ref 11.1–15.9)
IGA SERPL-MCNC: 130 MG/DL (ref 87–352)
IGG SERPL-MCNC: 757 MG/DL (ref 700–1600)
IGM SERPL-MCNC: 134 MG/DL (ref 26–217)
IMM GRANULOCYTES # BLD AUTO: 0 X10E3/UL (ref 0–0.1)
IMM GRANULOCYTES NFR BLD AUTO: 0 %
INTERPRETATION, 811987: NORMAL
LYMPHOCYTES # BLD AUTO: 0.9 X10E3/UL (ref 0.7–3.1)
LYMPHOCYTES NFR BLD AUTO: 32 %
MCH RBC QN AUTO: 29.4 PG (ref 26.6–33)
MCHC RBC AUTO-ENTMCNC: 33.9 G/DL (ref 31.5–35.7)
MCV RBC AUTO: 87 FL (ref 79–97)
MONOCYTES # BLD AUTO: 0.3 X10E3/UL (ref 0.1–0.9)
MONOCYTES NFR BLD AUTO: 10 %
MORPHOLOGY BLD-IMP: ABNORMAL
NEUTROPHILS # BLD AUTO: 1.4 X10E3/UL (ref 1.4–7)
NEUTROPHILS NFR BLD AUTO: 52 %
PLATELET # BLD AUTO: 97 X10E3/UL (ref 150–450)
PROT PATTERN SERPL IFE-IMP: NORMAL
RBC # BLD AUTO: 3.95 X10E6/UL (ref 3.77–5.28)
WBC # BLD AUTO: 2.6 X10E3/UL (ref 3.4–10.8)

## 2020-02-20 ENCOUNTER — DOCUMENTATION ONLY (OUTPATIENT)
Dept: NEUROLOGY | Age: 40
End: 2020-02-20

## 2020-02-26 ENCOUNTER — OFFICE VISIT (OUTPATIENT)
Dept: NEUROLOGY | Age: 40
End: 2020-02-26

## 2020-02-26 VITALS
WEIGHT: 127 LBS | SYSTOLIC BLOOD PRESSURE: 114 MMHG | HEIGHT: 67 IN | DIASTOLIC BLOOD PRESSURE: 78 MMHG | OXYGEN SATURATION: 100 % | BODY MASS INDEX: 19.93 KG/M2 | HEART RATE: 83 BPM | RESPIRATION RATE: 12 BRPM

## 2020-02-26 DIAGNOSIS — R56.9 CONVULSIONS, UNSPECIFIED CONVULSION TYPE (HCC): ICD-10-CM

## 2020-02-26 DIAGNOSIS — E03.4 HYPOTHYROIDISM DUE TO ACQUIRED ATROPHY OF THYROID: ICD-10-CM

## 2020-02-26 DIAGNOSIS — R20.2 NUMBNESS AND TINGLING OF BOTH UPPER EXTREMITIES: ICD-10-CM

## 2020-02-26 DIAGNOSIS — R20.2 NUMBNESS AND TINGLING OF BOTH FEET: Primary | ICD-10-CM

## 2020-02-26 DIAGNOSIS — R20.0 NUMBNESS AND TINGLING OF BOTH UPPER EXTREMITIES: ICD-10-CM

## 2020-02-26 DIAGNOSIS — G60.9 IDIOPATHIC SMALL FIBER PERIPHERAL NEUROPATHY: ICD-10-CM

## 2020-02-26 DIAGNOSIS — R20.0 NUMBNESS AND TINGLING OF BOTH FEET: Primary | ICD-10-CM

## 2020-02-26 DIAGNOSIS — M54.6 CHRONIC BILATERAL THORACIC BACK PAIN: ICD-10-CM

## 2020-02-26 DIAGNOSIS — G89.29 CHRONIC BILATERAL THORACIC BACK PAIN: ICD-10-CM

## 2020-02-26 DIAGNOSIS — R41.82 ALTERED MENTAL STATUS, UNSPECIFIED ALTERED MENTAL STATUS TYPE: ICD-10-CM

## 2020-02-26 NOTE — PROCEDURES
a  ELECTRODIAGNOSTIC REPORT      Test Date:  2020    Patient: Chaim Dooley : 1980 Physician: Chaya Myrick M.D. Sex: Female  < Ref Phys: Chaya Myrick M.D. Technician: Irish Eli    Patient History: Patient is a 56-year-old female with a history of migratory paresthesias and muscle pain with joint swelling and inflammation, for EMG study to rule out neuropathy, rule out myopathy, rule out other neuromuscular disease. Patient also complains of muscle twitching, rule out motor neuron disease. Neuro Exam[de-identified] Patient has symmetric and equal reflexes throughout with no Babinski or clonus sign present. Patient has normal strength and normal muscle bulk and tone. Patient has normal sensation to all modalities in both arms and legs. Patient's cranial nerves II through XII intact. Patient's gait station coordination and mental status are normal.    EMG report: This study shows electrophysiologic evidence of an essentially normal EMG and nerve conduction velocity study of the right upper extremity and both lower extremities, showing no clear evidence of entrapment neuropathy, polyneuropathy, radiculopathy, myopathy, or other neuromuscular disease. Clinical correlation recommended, and if clinically indicated correlation with metabolic studies and x-rays may be of further diagnostic benefit, and repeat study in 6 to 12 months time may also be of further diagnostic benefit if clinically indicated. b/l le/RUE    EMG & NCV Findings:  Evaluation of the left Fibular motor and the right Fibular motor nerves showed normal distal onset latency (L4.3, R4.8 ms), normal amplitude (L4.7, R5.9 mV), normal conduction velocity (B Fib-Ankle, L52, R47 m/s), and normal conduction velocity (Poplt-B Fib, L56, R59 m/s). The right median motor nerve showed normal distal onset latency (3.4 ms), normal amplitude (11.7 mV), and normal conduction velocity (Elbow-Wrist, 67 m/s).   The left tibial motor and the right tibial motor nerves showed normal distal onset latency (L4.8, R3.7 ms), normal amplitude (L16.7, R15.3 mV), and normal conduction velocity (Knee-Ankle, L43, R49 m/s). The right ulnar motor nerve showed normal distal onset latency (3.0 ms), normal amplitude (8.3 mV), decreased conduction velocity (Wrist-Abd Dig Minimi, 27 m/s), normal conduction velocity (B Elbow-Wrist, 60 m/s), and normal conduction velocity (A Elbow-B Elbow, 67 m/s). The right median sensory nerve showed normal distal onset latency (2.7 ms), normal distal peak latency (3.7 ms), and normal amplitude (64.4 µV). The right radial sensory, the left sural sensory, the right sural sensory, and the right ulnar sensory nerves showed normal distal peak latency (R2.3, L3.8, R4.2, R3.8 ms) and normal amplitude (R50.9, L21.1, R14.5, R64.4 µV). The left Sup Fibular sensory and the right Sup Fibular sensory nerves showed normal distal peak latency (L2.8, R2.9 ms), normal amplitude (L27.8, R10.9 µV), and normal conduction velocity (Lower leg-Lat ankle, L48, R43 m/s). All F Wave latencies were within normal limits. All F Wave left vs. right side latency differences were within normal limits.                 __________________  Rod Shaw M.D.        Nerve Conduction Studies  Anti Sensory Summary Table     Stim Site NR Onset (ms) Peak (ms) O-P Amp (µV) Norm Peak (ms) Norm O-P Amp Site1 Site2 Dist (cm) Norm López (m/s)   Right Median Anti Sensory (2nd Digit)  30.5°C   Wrist    2.7 3.7 64.4 <4 >11 Wrist 2nd Digit 14.0    Right Radial Anti Sensory (Base 1st Digit)  30.5°C   Wrist    1.7 2.3 50.9 <2.8 7 Wrist Base 1st Digit 10.0    Left Sup Fibular Anti Sensory (Lat ankle)  30.5°C   Lower leg    2.1 2.8 27.8 <4.4 >5.0 Lower leg Lat ankle 10.0 >32   Right Sup Fibular Anti Sensory (Lat ankle)  30.5°C   Lower leg    2.3 2.9 10.9 <4.4 >5.0 Lower leg Lat ankle 10.0 >32   Left Sural Anti Sensory (Lat Mall)  30.5°C   Calf    2.5 3.8 21.1 <4.5 >4.0 Calf Lat Mall 14.0    Right Sural Anti Sensory (Lat Mall)  30.5°C   Calf    3.5 4.2 14.5 <4.5 >4.0 Calf Lat Mall 14.0    Right Ulnar Anti Sensory (5th Digit)  30.5°C   Wrist    2.8 3.8 64.4 <4.0 >10 Wrist 5th Digit 14.0      Motor Summary Table     Stim Site NR Onset (ms) Norm Onset (ms) O-P Amp (mV) Norm O-P Amp P-T Amp (mV) Site1 Site2 Dist (cm) López (m/s)   Left Fibular Motor (Ext Dig Brev)  30.5°C   Ankle    4.3 <6.5 4.7 >2.6  Ankle Ext Dig Brev 8.0 19   B Fib    10.9  4.0   B Fib Ankle 34.0 52   Poplt    12.7  4.0   Poplt B Fib 10.0 56   Right Fibular Motor (Ext Dig Brev)  30.5°C   Ankle    4.8 <6.5 5.9 >2.6  Ankle Ext Dig Brev 8.0 17   B Fib    11.6  4.7   B Fib Ankle 32.0 47   Poplt    13.3  4.9   Poplt B Fib 10.0 59   Right Median Motor (Abd Poll Brev)  30.5°C   Wrist    3.4 <4.5 11.7 >4.1  Wrist Abd Poll Brev 8.0 24   Elbow    6.9  11.5   Elbow Wrist 23.5 67   Left Tibial Motor (Abd Miller Brev)  30.5°C   Ankle    4.8 <6.1 16.7 >5.3  Ankle Abd Miller Brev 8.0 17   Knee    14.1  11.4   Knee Ankle 40.0 43   Right Tibial Motor (Abd Miller Brev)  30.5°C   Ankle    3.7 <6.1 15.3 >5.3  Ankle Abd Miller Brev 8.0 22   Knee    12.0  11.6   Knee Ankle 41.0 49   Right Ulnar Motor (Abd Dig Minimi)  30.5°C   Wrist    3.0 <3.1 8.3 >7.0  Wrist Abd Dig Minimi 8.0 27   B Elbow    6.5  8.1   B Elbow Wrist 21.0 60   A Elbow    8.0  7.8   A Elbow B Elbow 10.0 67     F Wave Studies     NR F-Lat (ms) Lat Norm (ms) L-R F-Lat (ms) L-R Lat Norm   Left Tibial (Mrkrs) (Abd Hallucis)  30.5°C      53.90 <56 5.65 <5.7   Right Tibial (Mrkrs) (Abd Hallucis)  30.5°C      48.24 <56 5.65 <5.7   Right Ulnar (Mrkrs) (Abd Dig Min)  30.5°C      25.95 <32  <2.5     EMG     Side Muscle Nerve Root Ins Act Fibs Psw Recrt Duration Amp Poly Comment   Right Abd Poll Brev Median C8-T1 Nml Nml Nml Nml Nml Nml Nml    Right 1stDorInt Ulnar C8-T1 Nml Nml Nml Nml Nml Nml Nml    Right Biceps Musculocut C5-6 Nml Nml Nml Nml Nml Nml Nml    Right Triceps Radial C6-7-8 Nml Nml Nml Nml Nml Nml Nml    Right Deltoid Axillary C5-6 Nml Nml Nml Nml Nml Nml Nml    Right BrachioRad Radial C5-6 Nml Nml Nml Nml Nml Nml Nml    Right Lower Cerv Parasp Rami C7,T1 Nml Nml Nml Nml Nml Nml Nml    Right AntTibialis Dp Br Peron L4-5 Nml Nml Nml Nml Nml Nml Nml    Right VastusLat Femoral L2-4 Nml Nml Nml Nml Nml Nml Nml    Right Ext Dig Brev Dp Br Peron L5, S1 Nml Nml Nml Nml Nml Nml Nml    Right Peroneus Long   Nml Nml Nml Nml Nml Nml Nml    Right BicepsFemL Sciatic L5-S2 Nml Nml Nml Nml Nml Nml Nml    Right Lower Lumb Parasp Rami L5,S1 Nml Nml Nml Nml Nml Nml Nml    Right MedGastroc Tibial S1-2 Nml Nml Nml Nml Nml Nml Nml    Left AntTibialis Dp Br Peron L4-5 Nml Nml Nml Nml Nml Nml Nml    Left MedGastroc Tibial S1-2 Nml Nml Nml Nml Nml Nml Nml    Left VastusLat Femoral L2-4 Nml Nml Nml Nml Nml Nml Nml    Left BicepsFemL Sciatic L5-S2 Nml Nml Nml Nml Nml Nml Nml    Left Lower Lumb Parasp Rami L5,S1 Nml Nml Nml Nml Nml Nml Nml    Left Ext Dig Brev Dp Br Peron L5, S1 Nml Nml Nml Nml Nml Nml Nml    Left Peroneus Long   Nml Nml Nml Nml Nml Nml Nml          Waveforms:

## 2020-02-26 NOTE — LETTER
2/26/20 Patient: Suzie Sanders YOB: 1980 Date of Visit: 2/26/2020 Savannah Iyer MD 
[address] [facility] [address] VIA In Basket Dear Savannah Iyer MD, Thank you for referring Ms. Suzie Sanders to [address] [place] [address] for evaluation. My notes for this consultation are attached. Patient's EMG study was essentially normal of the right arm and both legs, showing no clear evidence of any neuropathy, myopathy, or other neuromuscular disease Patient had a positive GM 1 antibody titer that was only at 50 in the upper limits of normal study but we will repeat that again today just to follow that. Her white count went up as did her platelet count, and her hematologist does not think there is anything major wrong at this time. Our nurse practitioner will see her need for we will see her again in September, and she will call us if there is any problem in the interim. If you have questions, please do not hesitate to call me. I look forward to following your patient along with you. Sincerely, Geeta Eddy MD

## 2020-02-26 NOTE — PROGRESS NOTES
Patient's EMG study was essentially normal of the right arm and both legs, showing no clear evidence of any neuropathy, myopathy, or other neuromuscular disease  Patient had a positive GM 1 antibody titer that was only at 50 in the upper limits of normal study but we will repeat that again today just to follow that. Her white count went up as did her platelet count, and her hematologist does not think there is anything major wrong at this time. Our nurse practitioner will see her need for we will see her again in September, and she will call us if there is any problem in the interim.

## 2020-03-19 LAB — GM1 GANGL IGG TITR SER IA: 38 % (ref 0–30)

## 2021-04-09 ENCOUNTER — APPOINTMENT (OUTPATIENT)
Dept: ULTRASOUND IMAGING | Age: 41
End: 2021-04-09
Attending: EMERGENCY MEDICINE
Payer: COMMERCIAL

## 2021-04-09 ENCOUNTER — HOSPITAL ENCOUNTER (EMERGENCY)
Age: 41
Discharge: HOME | End: 2021-04-09
Attending: EMERGENCY MEDICINE
Payer: COMMERCIAL

## 2021-04-09 VITALS
HEIGHT: 67 IN | OXYGEN SATURATION: 100 % | BODY MASS INDEX: 20.76 KG/M2 | HEART RATE: 92 BPM | RESPIRATION RATE: 12 BRPM | SYSTOLIC BLOOD PRESSURE: 135 MMHG | WEIGHT: 132.28 LBS | DIASTOLIC BLOOD PRESSURE: 85 MMHG | TEMPERATURE: 98.1 F

## 2021-04-09 DIAGNOSIS — M79.89 LEG SWELLING: Primary | ICD-10-CM

## 2021-04-09 PROCEDURE — 99283 EMERGENCY DEPT VISIT LOW MDM: CPT

## 2021-04-09 PROCEDURE — 93971 EXTREMITY STUDY: CPT

## 2021-04-10 NOTE — ED NOTES
The patient was discharged home by Dr Josue Gan in stable condition. The patient is alert and oriented, in no respiratory distress and discharge vital signs obtained. The patient's diagnosis, condition and treatment were explained. The patient expressed understanding. A discharge plan has been developed. Aftercare instructions were given. Pt ambulatory out of the ED.

## 2021-04-10 NOTE — ED TRIAGE NOTES
Pt c/o left calf swelling for the last few weeks. Had a long car ride about same time of onset. Father has hx of DVT.

## 2021-04-10 NOTE — ED PROVIDER NOTES
Pt c/o left calf swelling for the last few weeks. Had a long car ride about same time of onset. Father has hx of DVT. 29-year-old female presenting to the ER complaints of left leg swelling for a week or 2. Patient had long car trip trip the beginning of March. Family history of DVTs but no personal history of DVT. Patient reports that feels like her left calf and knee and lower thigh are swollen. No numbness any weakness no trauma or injuries. No pain with movement of the knee. Pain is improved with massage of the calf. Patient presenting ER with concern for possible blood clot.   No chest pain or shortness of breath           Past Medical History:   Diagnosis Date    Anemia 2015    taking iron    Complication of anesthesia     severe vomiting; requires antinausea medication first    Disease of blood and blood forming organ     Irritable bowel syndrome without diarrhea     colonoscopy 2011 Dr Christopher Blackman.  she is gluten-free    Kidney disease 2018    kidney stones    Psychiatric problem 2015    anxiety, panic attack after last delivery    Rh negative status during pregnancy 9/22/2013    rhogham given 6/22/2015    Thrombocytopenia ([address]     last labs plt were 118    Thyroid activity decreased 2015    tested multiple times, continued to improve through pregnancy, on no meds at this time       Past Surgical History:   Procedure Laterality Date    HX COLONOSCOPY      Dr Fátima Collins HX OTHER SURGICAL      wisdom, echo to check for murmur     HX WISDOM TEETH EXTRACTION           Family History:   Problem Relation Age of Onset    Cancer Father         melanoma and prostatate    Elevated Lipids Father     Hypertension Father     Diabetes Father     Cancer Maternal Grandmother         uterine    Hypertension Maternal Grandmother     Heart Disease Maternal Grandmother     Cancer Maternal Grandfather         brain cancer    Heart Disease Maternal Grandfather     Hypertension Maternal Grandfather  Cancer Paternal Grandmother         breast cancer    Diabetes Paternal Grandmother     Cancer Paternal Grandfather         throid    Diabetes Paternal Grandfather     Hypertension Paternal Grandfather     Stroke Paternal Grandfather     Anxiety Mother         diabetes borderline, POTS, fibromyalgia, IBS    Thyroid Disease Mother     Other Mother         POTS,[place]SAUTONOMIA    Hypertension Brother         etohsim    Other Child         PANS    Eczema Child        Social History     Socioeconomic History    Marital status:      Spouse name: Not on file    Number of children: Not on file    Years of education: Not on file    Highest education level: Not on file   Occupational History    Not on file   Social Needs    Financial resource strain: Not on file    Food insecurity     Worry: Not on file     Inability: Not on file   [employer] needs     Medical: Not on file     Non-medical: Not on file   Tobacco Use    Smoking status: Never Smoker    Smokeless tobacco: Never Used   Substance and Sexual Activity    Alcohol use: No    Drug use: No    Sexual activity: Yes     Partners: Male     Birth control/protection: None   Lifestyle    Physical activity     Days per week: Not on file     Minutes per session: Not on file    Stress: Not on file   Relationships    Social connections     Talks on phone: Not on file     Gets together: Not on file     Attends [employer] service: Not on file     Active member of club or organization: Not on file     Attends meetings of clubs or organizations: Not on file     Relationship status: Not on file    Intimate partner violence     Fear of current or ex partner: Not on file     Emotionally abused: Not on file     Physically abused: Not on file     Forced sexual activity: Not on file   Other Topics Concern    Not on file   Social History Narrative    Mother of 7 children            15, 8, 8 6,, 5 (adopted) , 5, 2, 2 months     She adopted the 5 year old        9 yo diagnosed since daughter was 11years old         ALLERGIES: Gluten and Other medication    Review of Systems   Constitutional: Negative for chills and fever. HENT: Negative for congestion and sore throat. Eyes: Negative for pain. Respiratory: Negative for shortness of breath. Cardiovascular: Positive for leg swelling. Negative for chest pain. Gastrointestinal: Negative for abdominal pain, diarrhea, nausea and vomiting. Genitourinary: Negative for dysuria and flank pain. Musculoskeletal: Negative for back pain and neck pain. Skin: Negative for rash. Neurological: Negative for dizziness and headaches. Vitals:    04/09/21 2049   BP: (!) 161/89   Pulse: 99   Resp: 14   Temp: 98.1 °F (36.7 °C)   SpO2: 100%   Weight: 60 kg (132 lb 4.4 oz)   Height: 5' 7\" (1.702 m)            Physical Exam  Constitutional:       Appearance: Normal appearance. HENT:      Head: Normocephalic. Nose: Nose normal.      Mouth/Throat:      Pharynx: Oropharynx is clear. Eyes:      Conjunctiva/sclera: Conjunctivae normal.   Neck:      Musculoskeletal: Neck supple. Cardiovascular:      Rate and Rhythm: Normal rate and regular rhythm. Pulses:           Dorsalis pedis pulses are 2+ on the left side. Pulmonary:      Effort: Pulmonary effort is normal. No respiratory distress. Musculoskeletal:         General: Swelling present. Comments: Minimal left calf swelling with no tenderness. No pain in the left knee or knee effusions. No erythema or rash. Skin:     General: Skin is warm. Capillary Refill: Capillary refill takes less than 2 seconds. Neurological:      Mental Status: She is alert. MDM       Procedures        No results found for this or any previous visit (from the past 24 hour(s)).     Duplex Lower Ext Venous Left    Result Date: 4/9/2021  · No evidence of acute deep vein thrombosis in the left common femoral, superficial femoral, popliteal, posterior tibial, and peroneal veins. The veins were imaged in the transverse and longitudinal planes. The vessels showed normal color filling and compressibility. Doppler interrogation showed phasic and spontaneous flow. · No evidence of deep vein thrombosis in the left lower extremity. Discussed the discharge impression and any labs and the results with the patient. Answered any questions and addressed any concerns. Discussed the importance of following up with their primary care provider and/or specialist.  Discussed signs or symptoms that would warrant return back to the ER for further evaluation. The patient is agreeable with discharge.

## 2021-06-09 ENCOUNTER — OFFICE VISIT (OUTPATIENT)
Dept: INTERNAL MEDICINE CLINIC | Age: 41
End: 2021-06-09
Payer: COMMERCIAL

## 2021-06-09 VITALS
DIASTOLIC BLOOD PRESSURE: 87 MMHG | HEART RATE: 98 BPM | SYSTOLIC BLOOD PRESSURE: 128 MMHG | HEIGHT: 67 IN | TEMPERATURE: 99.1 F | OXYGEN SATURATION: 98 % | RESPIRATION RATE: 14 BRPM | WEIGHT: 129 LBS | BODY MASS INDEX: 20.25 KG/M2

## 2021-06-09 DIAGNOSIS — Z00.00 WELL ADULT EXAM: Primary | ICD-10-CM

## 2021-06-09 PROCEDURE — 99395 PREV VISIT EST AGE 18-39: CPT | Performed by: INTERNAL MEDICINE

## 2021-06-09 RX ORDER — MAGNESIUM OXIDE 200 MG
TABLET,CHEWABLE ORAL
COMMUNITY
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
## 2021-06-28 ENCOUNTER — OFFICE VISIT (OUTPATIENT)
Dept: CARDIOLOGY CLINIC | Age: 41
End: 2021-06-28
Payer: COMMERCIAL

## 2021-06-28 VITALS
BODY MASS INDEX: 20.62 KG/M2 | SYSTOLIC BLOOD PRESSURE: 130 MMHG | OXYGEN SATURATION: 99 % | DIASTOLIC BLOOD PRESSURE: 84 MMHG | WEIGHT: 131.4 LBS | RESPIRATION RATE: 13 BRPM | HEART RATE: 100 BPM | HEIGHT: 67 IN

## 2021-06-28 DIAGNOSIS — R06.02 SOB (SHORTNESS OF BREATH): ICD-10-CM

## 2021-06-28 DIAGNOSIS — R01.1 MURMUR: Primary | ICD-10-CM

## 2021-06-28 DIAGNOSIS — R60.0 BILATERAL LEG EDEMA: ICD-10-CM

## 2021-06-28 DIAGNOSIS — R60.9 EDEMA, UNSPECIFIED TYPE: ICD-10-CM

## 2021-06-28 DIAGNOSIS — R07.89 OTHER CHEST PAIN: ICD-10-CM

## 2021-06-28 DIAGNOSIS — R00.0 TACHYCARDIA: ICD-10-CM

## 2021-06-28 PROCEDURE — 99214 OFFICE O/P EST MOD 30 MIN: CPT | Performed by: SPECIALIST

## 2021-06-28 NOTE — PROGRESS NOTES
HISTORY OF PRESENT ILLNESS  Joon Hurley is a 47 y.o. female     SUMMARY:   Problem List  Date Reviewed: 6/28/2021        Codes Class Noted    Hypothyroidism due to acquired atrophy of thyroid ICD-10-CM: E03.4  ICD-9-CM: 244.8, 246.8  1/28/2020        Altered mental status ICD-10-CM: R41.82  ICD-9-CM: 780.97  1/28/2020        Convulsions ([facility] ICD-10-CM: R56.9  ICD-9-CM: 780.39  1/28/2020        Idiopathic small fiber peripheral neuropathy ICD-10-CM: G60.9  ICD-9-CM: 356.9  1/28/2020        Chronic bilateral thoracic back pain ICD-10-CM: M54.6, G89.29  ICD-9-CM: 724.1, 338.29  1/28/2020        Numbness and tingling of both upper extremities ICD-10-CM: R20.0, R20.2  ICD-9-CM: 782.0  1/28/2020        Numbness and tingling of both feet ICD-10-CM: R20.0, R20.2  ICD-9-CM: 782.0  1/28/2020        Sepsis ([facility] ICD-10-CM: A41.9  ICD-9-CM: 038.9, 995.91  7/8/2019        Mastitis ICD-10-CM: N61.0  ICD-9-CM: 611.0  7/6/2019        Irritable bowel syndrome without diarrhea ICD-10-CM: K58.9  ICD-9-CM: 564.1  6/19/2018        Normal delivery ICD-10-CM: O80  ICD-9-CM: 519  2/23/2018        Rh negative status during pregnancy ICD-10-CM: O26.899, Z67.91  ICD-9-CM: 646.83  9/22/2013              Current Outpatient Medications on File Prior to Visit   Medication Sig    magnesium oxide 200 mg magnesium chew Take  by mouth.  zinc sulfate (ZINC-15 PO) Take  by mouth.  ferrous sulfate (IRON PO) Take  by mouth.  Omega-3 Fatty Acids (FISH OIL) 500 mg cap Take  by mouth. Pt unsure of dose    cyanocobalamin (VITAMIN B-12) 500 mcg tablet Take 500 mcg by mouth daily.  ergocalciferol, vitamin D2, (VITAMIN D2 PO) Take  by mouth. Indications: not taking     No current facility-administered medications on file prior to visit.        CARDIOLOGY STUDIES TO DATE:  11/13/19 echo mild lvh, trace mr  11/19 event monitor, sinus tach, occ pac, pvcs    Chief Complaint   Patient presents with    Follow-up     HPI :  She and her entire family got Covid back in the end of last year and since then she has had some trouble. She has shortness of breath with extreme exercise, she has noticed that when she sits up her heart rate seems to go up abnormally though she has not fainted, and she has developed some dependent lower extremity edema. She had Dopplers in the not too distant past which were negative. She still is being seen by a naturopath virtually who is in [place] and she had some blood work not long ago which was reportedly normal including her thyroid.   CARDIAC ROS:   negative for chest pain, syncope, orthopnea, paroxysmal nocturnal dyspnea, exertional chest pressure/discomfort, claudication    Family History   Problem Relation Age of Onset    Cancer Father         melanoma and prostatate    Elevated Lipids Father     Hypertension Father     Diabetes Father     Cancer Maternal Grandmother         uterine    Hypertension Maternal Grandmother     Heart Disease Maternal Grandmother     Cancer Maternal Grandfather         brain cancer    Heart Disease Maternal Grandfather     Hypertension Maternal Grandfather     Cancer Paternal Grandmother         breast cancer    Diabetes Paternal Grandmother     Cancer Paternal Grandfather         throid    Diabetes Paternal Grandfather     Hypertension Paternal Grandfather     Stroke Paternal Grandfather     Anxiety Mother         diabetes borderline, POTS, fibromyalgia, IBS    Thyroid Disease Mother     Other Mother         POTS,[place]OM[place]    Hypertension Brother         etohsim    Other Child         PANS    Eczema Child        Past Medical History:   Diagnosis Date    Anemia 2015    taking iron    Complication of anesthesia     severe vomiting; requires antinausea medication first    Irritable bowel syndrome without diarrhea     colonoscopy 2011 Dr Steph Ledesma.  she is gluten-free    Kidney disease 2018    kidney stones    Psychiatric problem 2015    anxiety, panic attack after last delivery    Rh negative status during pregnancy 9/22/2013    rhogham given 6/22/2015    Situational stress     15 yo special needs daugther    Thrombocytopenia ([address]     last labs plt were 118    Thyroid activity decreased 2015    tested multiple times, continued to improve through pregnancy, on no meds at this time       GENERAL ROS:  A comprehensive review of systems was negative except for that written in the HPI. Visit Vitals  /84 (BP 1 Location: Right arm, BP Patient Position: Sitting, BP Cuff Size: Adult)   Pulse 100   Resp 13   Ht 5' 7\" (1.702 m)   Wt 131 lb 6.4 oz (59.6 kg)   SpO2 99%   BMI 20.58 kg/m²       Wt Readings from Last 3 Encounters:   06/28/21 131 lb 6.4 oz (59.6 kg)   06/09/21 129 lb (58.5 kg)   04/09/21 132 lb 4.4 oz (60 kg)            BP Readings from Last 3 Encounters:   06/28/21 130/84   06/09/21 128/87   04/09/21 135/85       PHYSICAL EXAM  General appearance: alert, cooperative, no distress, appears stated age  Neurologic: Alert and oriented X 3  Neck: supple, symmetrical, trachea midline, no adenopathy, no carotid bruit and no JVD  Lungs: clear to auscultation bilaterally  Heart: regular rate and rhythm, S1, S2 normal, no murmur, click, rub or gallop  Extremities: extremities normal, atraumatic, no cyanosis tr edema    Lab Results   Component Value Date/Time    Cholesterol, total 155 05/16/2019 10:34 AM    Cholesterol, total 164 05/10/2018 02:11 PM    HDL Cholesterol 68 05/16/2019 10:34 AM    HDL Cholesterol 63 05/10/2018 02:11 PM    LDL, calculated 74 05/16/2019 10:34 AM    LDL, calculated 87 05/10/2018 02:11 PM    Triglyceride 66 05/16/2019 10:34 AM    Triglyceride 71 05/10/2018 02:11 PM     ASSESSMENT :      With all these new symptoms and Covid exposure I do think she needs another echocardiogram to make sure everything looks okay. Her mother has pots so it is possible she could be developing that given her heart rate changes but fortunately no real symptoms. I suspect the edema is multifactorial probably related to venous disease. She has had 6 children. We will check her electrolytes and albumin. current treatment plan is effective, no change in therapy  lab results and schedule of future lab studies reviewed with patient  reviewed diet, exercise and weight control    Encounter Diagnoses   Name Primary?  Murmur Yes    Tachycardia     Other chest pain     Bilateral leg edema      No orders of the defined types were placed in this encounter. Follow-up and Dispositions    · Return in about 6 months (around 12/28/2021). Hector Johnson MD  6/28/2021  Please note that this dictation was completed with [employer], the [employer] voice recognition software. Quite often unanticipated grammatical, syntax, homophones, and other interpretive errors are inadvertently transcribed by the computer software. Please disregard these errors. Please excuse any errors that have escaped final proofreading. Thank you.

## 2021-07-01 ENCOUNTER — TELEPHONE (OUTPATIENT)
Dept: CARDIOLOGY CLINIC | Age: 41
End: 2021-07-01

## 2021-07-01 ENCOUNTER — ANCILLARY PROCEDURE (OUTPATIENT)
Dept: CARDIOLOGY CLINIC | Age: 41
End: 2021-07-01
Payer: COMMERCIAL

## 2021-07-01 VITALS — BODY MASS INDEX: 20.56 KG/M2 | WEIGHT: 131 LBS | HEIGHT: 67 IN

## 2021-07-01 DIAGNOSIS — R60.9 EDEMA, UNSPECIFIED TYPE: ICD-10-CM

## 2021-07-01 DIAGNOSIS — R06.02 SOB (SHORTNESS OF BREATH): ICD-10-CM

## 2021-07-01 LAB
ECHO AO ASC DIAM: 2.48 CM
ECHO AO ROOT DIAM: 2.61 CM
ECHO AV AREA PEAK VELOCITY: 2.48 CM2
ECHO AV AREA VTI: 2.67 CM2
ECHO AV AREA/BSA PEAK VELOCITY: 1.5 CM2/M2
ECHO AV AREA/BSA VTI: 1.6 CM2/M2
ECHO AV MEAN GRADIENT: 3.89 MMHG
ECHO AV PEAK GRADIENT: 6.55 MMHG
ECHO AV PEAK VELOCITY: 127.94 CM/S
ECHO AV VTI: 25.61 CM
ECHO IVC PROX: 2.68 CM
ECHO LA AREA 4C: 15.08 CM2
ECHO LA MAJOR AXIS: 2.57 CM
ECHO LA MINOR AXIS: 1.52 CM
ECHO LA VOL 2C: 30.31 ML (ref 22–52)
ECHO LA VOL 4C: 32.48 ML (ref 22–52)
ECHO LA VOL BP: 36.96 ML (ref 22–52)
ECHO LA VOL/BSA BIPLANE: 21.87 ML/M2 (ref 16–28)
ECHO LA VOLUME INDEX A2C: 17.93 ML/M2 (ref 16–28)
ECHO LA VOLUME INDEX A4C: 19.22 ML/M2 (ref 16–28)
ECHO LV E' LATERAL VELOCITY: 15.24 CM/S
ECHO LV E' SEPTAL VELOCITY: 11.18 CM/S
ECHO LV EDV A2C: 53.61 ML
ECHO LV EDV A4C: 58.22 ML
ECHO LV EDV BP: 55.98 ML (ref 56–104)
ECHO LV EDV INDEX A4C: 34.4 ML/M2
ECHO LV EDV INDEX BP: 33.1 ML/M2
ECHO LV EDV NDEX A2C: 31.7 ML/M2
ECHO LV EJECTION FRACTION A2C: 70 PERCENT
ECHO LV EJECTION FRACTION A4C: 58 PERCENT
ECHO LV EJECTION FRACTION BIPLANE: 63.6 PERCENT (ref 55–100)
ECHO LV ESV A2C: 15.95 ML
ECHO LV ESV A4C: 24.59 ML
ECHO LV ESV BP: 20.36 ML (ref 19–49)
ECHO LV ESV INDEX A2C: 9.4 ML/M2
ECHO LV ESV INDEX A4C: 14.6 ML/M2
ECHO LV ESV INDEX BP: 12 ML/M2
ECHO LV INTERNAL DIMENSION DIASTOLIC: 3.3 CM (ref 3.9–5.3)
ECHO LV INTERNAL DIMENSION SYSTOLIC: 2.2 CM
ECHO LV IVSD: 0.87 CM (ref 0.6–0.9)
ECHO LV MASS 2D: 92.5 G (ref 67–162)
ECHO LV MASS INDEX 2D: 54.7 G/M2 (ref 43–95)
ECHO LV POSTERIOR WALL DIASTOLIC: 1.1 CM (ref 0.6–0.9)
ECHO LVOT DIAM: 1.93 CM
ECHO LVOT PEAK GRADIENT: 4.7 MMHG
ECHO LVOT PEAK VELOCITY: 108.36 CM/S
ECHO LVOT SV: 68.5 ML
ECHO LVOT VTI: 23.38 CM
ECHO MV A VELOCITY: 63.58 CM/S
ECHO MV E DECELERATION TIME (DT): 147.58 MS
ECHO MV E VELOCITY: 103.33 CM/S
ECHO MV E/A RATIO: 1.63
ECHO MV E/E' LATERAL: 6.78
ECHO MV E/E' RATIO (AVERAGED): 8.01
ECHO MV E/E' SEPTAL: 9.24
ECHO PV MAX VELOCITY: 103.96 CM/S
ECHO PV PEAK INSTANTANEOUS GRADIENT SYSTOLIC: 4.32 MMHG
ECHO RA MINOR AXIS: 2.53 CM
ECHO RV INTERNAL DIMENSION: 2.83 CM
ECHO RV TAPSE: 2.22 CM (ref 1.5–2)
ECHO TV REGURGITANT MAX VELOCITY: 264.03 CM/S
ECHO TV REGURGITANT PEAK GRADIENT: 27.89 MMHG
LA VOL DISK BP: 33.37 ML (ref 22–52)

## 2021-07-01 PROCEDURE — 93306 TTE W/DOPPLER COMPLETE: CPT | Performed by: SPECIALIST

## 2021-07-01 NOTE — TELEPHONE ENCOUNTER
Called patient. Verified patient's identity with two identifiers. Notified patient of results and Dr. Jaci Sanchez message. Patient verbalized understanding and denied further questions or concerns.

## 2021-07-01 NOTE — TELEPHONE ENCOUNTER
----- Message from Leda Jessica MD sent at 7/1/2021  1:14 PM EDT -----  Heart muscle is strong and valves all ok, looks great

## 2021-07-03 LAB
ALBUMIN SERPL-MCNC: 4.6 G/DL (ref 3.8–4.8)
ALBUMIN/GLOB SERPL: 2 {RATIO} (ref 1.2–2.2)
ALP SERPL-CCNC: 71 IU/L (ref 48–121)
ALT SERPL-CCNC: 15 IU/L (ref 0–32)
AST SERPL-CCNC: 20 IU/L (ref 0–40)
BILIRUB SERPL-MCNC: 0.5 MG/DL (ref 0–1.2)
BUN SERPL-MCNC: 8 MG/DL (ref 6–20)
BUN/CREAT SERPL: 13 (ref 9–23)
CALCIUM SERPL-MCNC: 9 MG/DL (ref 8.7–10.2)
CHLORIDE SERPL-SCNC: 102 MMOL/L (ref 96–106)
CO2 SERPL-SCNC: 22 MMOL/L (ref 20–29)
CREAT SERPL-MCNC: 0.62 MG/DL (ref 0.57–1)
GLOBULIN SER CALC-MCNC: 2.3 G/DL (ref 1.5–4.5)
GLUCOSE SERPL-MCNC: 82 MG/DL (ref 65–99)
POTASSIUM SERPL-SCNC: 4.2 MMOL/L (ref 3.5–5.2)
PROT SERPL-MCNC: 6.9 G/DL (ref 6–8.5)
SODIUM SERPL-SCNC: 138 MMOL/L (ref 134–144)

## 2021-11-14 NOTE — ROUTINE PROCESS
Bedside shift change report given to [address] [place] [address] (oncoming nurse) by KALIE Snyder RN (offgoing nurse). Report included the following information SBAR, Procedure Summary, Intake/Output, MAR and Recent Results. Labs/Imaging Studies/Medications

## 2022-03-18 PROBLEM — R56.9 CONVULSIONS (HCC): Status: ACTIVE | Noted: 2020-01-28

## 2022-03-19 PROBLEM — M54.6 CHRONIC BILATERAL THORACIC BACK PAIN: Status: ACTIVE | Noted: 2020-01-28

## 2022-03-19 PROBLEM — K58.9 IRRITABLE BOWEL SYNDROME WITHOUT DIARRHEA: Status: ACTIVE | Noted: 2018-06-19

## 2022-03-19 PROBLEM — R20.0 NUMBNESS AND TINGLING OF BOTH UPPER EXTREMITIES: Status: ACTIVE | Noted: 2020-01-28

## 2022-03-19 PROBLEM — R20.2 NUMBNESS AND TINGLING OF BOTH FEET: Status: ACTIVE | Noted: 2020-01-28

## 2022-03-19 PROBLEM — R20.0 NUMBNESS AND TINGLING OF BOTH FEET: Status: ACTIVE | Noted: 2020-01-28

## 2022-03-19 PROBLEM — E03.4 HYPOTHYROIDISM DUE TO ACQUIRED ATROPHY OF THYROID: Status: ACTIVE | Noted: 2020-01-28

## 2022-03-19 PROBLEM — G89.29 CHRONIC BILATERAL THORACIC BACK PAIN: Status: ACTIVE | Noted: 2020-01-28

## 2022-03-19 PROBLEM — N61.0 MASTITIS: Status: ACTIVE | Noted: 2019-07-06

## 2022-03-19 PROBLEM — A41.9 SEPSIS (HCC): Status: ACTIVE | Noted: 2019-07-08

## 2022-03-19 PROBLEM — R20.2 NUMBNESS AND TINGLING OF BOTH UPPER EXTREMITIES: Status: ACTIVE | Noted: 2020-01-28

## 2022-03-20 PROBLEM — G60.9 IDIOPATHIC SMALL FIBER PERIPHERAL NEUROPATHY: Status: ACTIVE | Noted: 2020-01-28

## 2022-03-20 PROBLEM — R41.82 ALTERED MENTAL STATUS: Status: ACTIVE | Noted: 2020-01-28

## 2022-06-28 ENCOUNTER — OFFICE VISIT (OUTPATIENT)
Dept: INTERNAL MEDICINE CLINIC | Age: 42
End: 2022-06-28
Payer: COMMERCIAL

## 2022-06-28 VITALS
HEIGHT: 67 IN | TEMPERATURE: 98.6 F | RESPIRATION RATE: 14 BRPM | HEART RATE: 98 BPM | DIASTOLIC BLOOD PRESSURE: 82 MMHG | WEIGHT: 133 LBS | BODY MASS INDEX: 20.88 KG/M2 | OXYGEN SATURATION: 99 % | SYSTOLIC BLOOD PRESSURE: 121 MMHG

## 2022-06-28 DIAGNOSIS — Z00.00 WELL ADULT EXAM: Primary | ICD-10-CM

## 2022-06-28 DIAGNOSIS — K42.9 PERIUMBILICAL HERNIA: ICD-10-CM

## 2022-06-28 DIAGNOSIS — R60.9 WATER RETENTION: ICD-10-CM

## 2022-06-28 PROCEDURE — 99395 PREV VISIT EST AGE 18-39: CPT | Performed by: INTERNAL MEDICINE

## 2022-06-28 PROCEDURE — 99214 OFFICE O/P EST MOD 30 MIN: CPT | Performed by: INTERNAL MEDICINE
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
## 2022-09-16 ENCOUNTER — TELEPHONE (OUTPATIENT)
Dept: INTERNAL MEDICINE CLINIC | Age: 42
End: 2022-09-16

## 2022-09-16 DIAGNOSIS — D69.6 THROMBOCYTOPENIA (HCC): Primary | ICD-10-CM

## 2022-09-16 LAB
25(OH)D3+25(OH)D2 SERPL-MCNC: 68.8 NG/ML (ref 30–100)
ALBUMIN SERPL-MCNC: 5.2 G/DL (ref 3.8–4.8)
ALBUMIN/GLOB SERPL: 1.9 {RATIO} (ref 1.2–2.2)
ALP SERPL-CCNC: 71 IU/L (ref 44–121)
ALT SERPL-CCNC: 12 IU/L (ref 0–32)
APPEARANCE UR: CLEAR
AST SERPL-CCNC: 19 IU/L (ref 0–40)
BACTERIA #/AREA URNS HPF: NORMAL /[HPF]
BILIRUB SERPL-MCNC: 0.5 MG/DL (ref 0–1.2)
BILIRUB UR QL STRIP: NEGATIVE
BUN SERPL-MCNC: 15 MG/DL (ref 6–20)
BUN/CREAT SERPL: 23 (ref 9–23)
CALCIUM SERPL-MCNC: 9.6 MG/DL (ref 8.7–10.2)
CASTS URNS QL MICRO: NORMAL /LPF
CHLORIDE SERPL-SCNC: 99 MMOL/L (ref 96–106)
CHOLEST SERPL-MCNC: 169 MG/DL (ref 100–199)
CO2 SERPL-SCNC: 26 MMOL/L (ref 20–29)
COLOR UR: YELLOW
CREAT SERPL-MCNC: 0.66 MG/DL (ref 0.57–1)
CRP SERPL HS-MCNC: 0.9 MG/L (ref 0–3)
EGFR: 114 ML/MIN/1.73
EPI CELLS #/AREA URNS HPF: NORMAL /HPF (ref 0–10)
ERYTHROCYTE [SEDIMENTATION RATE] IN BLOOD BY WESTERGREN METHOD: 4 MM/HR (ref 0–32)
EST. AVERAGE GLUCOSE BLD GHB EST-MCNC: 108 MG/DL
FOLATE SERPL-MCNC: 11.6 NG/ML
GGT SERPL-CCNC: 8 IU/L (ref 0–60)
GLOBULIN SER CALC-MCNC: 2.7 G/DL (ref 1.5–4.5)
GLUCOSE SERPL-MCNC: 91 MG/DL (ref 65–99)
GLUCOSE UR QL STRIP: NEGATIVE
HBA1C MFR BLD: 5.4 % (ref 4.8–5.6)
HCV AB S/CO SERPL IA: <0.1 S/CO RATIO (ref 0–0.9)
HCYS SERPL-SCNC: 10.7 UMOL/L (ref 0–14.5)
HDLC SERPL-MCNC: 63 MG/DL
HGB UR QL STRIP: NEGATIVE
IMP & REVIEW OF LAB RESULTS: NORMAL
KETONES UR QL STRIP: NEGATIVE
LDLC SERPL CALC-MCNC: 91 MG/DL (ref 0–99)
LEUKOCYTE ESTERASE UR QL STRIP: NEGATIVE
MICRO URNS: NORMAL
MICRO URNS: NORMAL
NITRITE UR QL STRIP: NEGATIVE
PH UR STRIP: 7.5 [PH] (ref 5–7.5)
POTASSIUM SERPL-SCNC: 4.3 MMOL/L (ref 3.5–5.2)
PROT SERPL-MCNC: 7.9 G/DL (ref 6–8.5)
PROT UR QL STRIP: NEGATIVE
RBC #/AREA URNS HPF: NORMAL /HPF (ref 0–2)
SODIUM SERPL-SCNC: 139 MMOL/L (ref 134–144)
SP GR UR STRIP: 1.01 (ref 1–1.03)
TRIGL SERPL-MCNC: 79 MG/DL (ref 0–149)
URINALYSIS REFLEX, 377202: NORMAL
UROBILINOGEN UR STRIP-MCNC: 0.2 MG/DL (ref 0.2–1)
VIT B12 SERPL-MCNC: 483 PG/ML (ref 232–1245)
VLDLC SERPL CALC-MCNC: 15 MG/DL (ref 5–40)
WBC #/AREA URNS HPF: NORMAL /HPF (ref 0–5)

## 2022-09-16 NOTE — TELEPHONE ENCOUNTER
Patient states she needs an order for a lab with cbc with differential faxed to the [facility] at [address] [place] [address] in [place]. She is hoping to have these labs done today at 10:30.

## 2022-09-17 LAB
BASOPHILS # BLD AUTO: 0.1 X10E3/UL (ref 0–0.2)
BASOPHILS NFR BLD AUTO: 1 %
EOSINOPHIL # BLD AUTO: 0.1 X10E3/UL (ref 0–0.4)
EOSINOPHIL NFR BLD AUTO: 3 %
ERYTHROCYTE [DISTWIDTH] IN BLOOD BY AUTOMATED COUNT: 12.5 % (ref 11.7–15.4)
HCT VFR BLD AUTO: 37.5 % (ref 34–46.6)
HGB BLD-MCNC: 12.7 G/DL (ref 11.1–15.9)
IMM GRANULOCYTES # BLD AUTO: 0 X10E3/UL (ref 0–0.1)
IMM GRANULOCYTES NFR BLD AUTO: 0 %
LYMPHOCYTES # BLD AUTO: 1.6 X10E3/UL (ref 0.7–3.1)
LYMPHOCYTES NFR BLD AUTO: 32 %
MCH RBC QN AUTO: 30.4 PG (ref 26.6–33)
MCHC RBC AUTO-ENTMCNC: 33.9 G/DL (ref 31.5–35.7)
MCV RBC AUTO: 90 FL (ref 79–97)
MONOCYTES # BLD AUTO: 0.5 X10E3/UL (ref 0.1–0.9)
MONOCYTES NFR BLD AUTO: 10 %
NEUTROPHILS # BLD AUTO: 2.7 X10E3/UL (ref 1.4–7)
NEUTROPHILS NFR BLD AUTO: 54 %
PLATELET # BLD AUTO: 147 X10E3/UL (ref 150–450)
RBC # BLD AUTO: 4.18 X10E6/UL (ref 3.77–5.28)
WBC # BLD AUTO: 4.9 X10E3/UL (ref 3.4–10.8)

## 2022-11-22 NOTE — TELEPHONE ENCOUNTER
Called neuro. They do not have a sooner appt with Dr Vel Gomez than what pt already has. A message was sent to Dr Tosin Gomez nurse to call pt to try to get in sooner. Pt was advised.  She voices understanding and thanks
Patient is requesting to have PCP assist her in getting a sooner apt with Dr Daria Davis Neurologist at [address] she was able to be seen was in 01/2020, states her rheumatologist referred her to a neurologist due to on going symptoms that PCP is aware of . Patient did not have contact information for Dr Alta Tompkins office .    After searching the physician online, his phone number is    [phone] , patient can be reached at [phone]
Yes

## 2023-01-10 ENCOUNTER — TELEPHONE (OUTPATIENT)
Dept: INTERNAL MEDICINE CLINIC | Age: 43
End: 2023-01-10

## 2023-01-10 NOTE — TELEPHONE ENCOUNTER
Patient called via the [address] [place] [address] to resolve a billing issue. Patient states billing has told her to call our office. Patient states it is regarding labs from a well check in June. Patient states at the physical she told her doctor she felt like she was retaining water, so the doctor ordered additional labs. Patient states she was charged for both a physical and sick visit. Patient states she needs this fixed/recoded. PSR advised patient that this may have happened because she dicussed things outside the scope of the physical. PSR advised patient that there are warnings on the doors letting patients know if anything is dicussed outside the scope of their visit they may be charged for an additional appointment. Patient stated she saw no such warnings and was also never told this. Patient states she has called multiple times regarding this issue and was told it could be fixed so she has not paid for the additional visit. Patient states she is upset because due to being told it could be fixed she has let it sit and it has now gone to collections and is hurting her credit. Patient would like a call back from the  to discuss this.

## 2023-01-12 NOTE — TELEPHONE ENCOUNTER
Patient is calling back regarding this issue. PSR is noting this in her chart and has forwarded her to the 's voicemail.

## 2023-07-03 ENCOUNTER — OFFICE VISIT (OUTPATIENT)
Age: 43
End: 2023-07-03
Payer: COMMERCIAL

## 2023-07-03 VITALS
RESPIRATION RATE: 14 BRPM | WEIGHT: 140.8 LBS | HEIGHT: 67 IN | BODY MASS INDEX: 22.1 KG/M2 | SYSTOLIC BLOOD PRESSURE: 121 MMHG | HEART RATE: 89 BPM | OXYGEN SATURATION: 97 % | DIASTOLIC BLOOD PRESSURE: 80 MMHG

## 2023-07-03 DIAGNOSIS — Z00.00 WELL ADULT EXAM: Primary | ICD-10-CM

## 2023-07-03 PROCEDURE — 99396 PREV VISIT EST AGE 40-64: CPT | Performed by: INTERNAL MEDICINE

## 2023-07-03 SDOH — ECONOMIC STABILITY: FOOD INSECURITY: WITHIN THE PAST 12 MONTHS, YOU WORRIED THAT YOUR FOOD WOULD RUN OUT BEFORE YOU GOT MONEY TO BUY MORE.: NEVER TRUE

## 2023-07-03 SDOH — ECONOMIC STABILITY: FOOD INSECURITY: WITHIN THE PAST 12 MONTHS, THE FOOD YOU BOUGHT JUST DIDN'T LAST AND YOU DIDN'T HAVE MONEY TO GET MORE.: NEVER TRUE

## 2023-07-03 SDOH — ECONOMIC STABILITY: HOUSING INSECURITY
IN THE LAST 12 MONTHS, WAS THERE A TIME WHEN YOU DID NOT HAVE A STEADY PLACE TO SLEEP OR SLEPT IN A SHELTER (INCLUDING NOW)?: NO

## 2023-07-03 SDOH — ECONOMIC STABILITY: INCOME INSECURITY: HOW HARD IS IT FOR YOU TO PAY FOR THE VERY BASICS LIKE FOOD, HOUSING, MEDICAL CARE, AND HEATING?: NOT HARD AT ALL

## 2023-07-03 ASSESSMENT — PATIENT HEALTH QUESTIONNAIRE - PHQ9
SUM OF ALL RESPONSES TO PHQ QUESTIONS 1-9: 0
2. FEELING DOWN, DEPRESSED OR HOPELESS: 0
SUM OF ALL RESPONSES TO PHQ9 QUESTIONS 1 & 2: 0
SUM OF ALL RESPONSES TO PHQ QUESTIONS 1-9: 0
1. LITTLE INTEREST OR PLEASURE IN DOING THINGS: 0

## 2023-07-03 NOTE — PROGRESS NOTES
ASSESSMENT & PLAN:  1. Well adult exam  Patient presents for her annual.  She reports overall very good health. She is being followed by her specialists Dr. Jassi Angeles with dermatology, she is having skin cancer screening every 2 years. She sees Dr. Kae Marino annually. She is following with Dr. Josephus Kussmaul and I have also checked her CBC  She is followed by Dr. Frank Patel with [facility]-     Her endocrinologist Dr. Luh Read retired. She is followed by her naturopath, Blessing Mcdowell from [place]. She is requesting labs for her assessment and treatment from him. Discussed with patient that can order however her insurance may not fully cover and she should check coverage. Also if abnormal with regards to hormones would defer to [address] or her new endocrinologist.  She is aware. CBC with Auto Differential; Future  -     Comprehensive Metabolic Panel; Future  -     Hemoglobin A1C; Future  -     Lipid Panel; Future  -     Vitamin D 25 Hydroxy; Future  -     TSH + Free T4 Panel; Future  -     Homocysteine, Plasma; Future  -     High sensitivity CRP; Future  -     DHEA-Sulfate; Future  -     Progesterone; Future  -     Estradiol; Future  -     FSH & LH; Future  -     Prolactin; Future  -     Testosterone, free, total; Future  -     Cortisol AM, Total; Future               Chief Complaint   Patient presents with    Complete Physical       Patient reports overall very good health. She is followed by her specialists. She had COVID about 4-5 times. 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 Thyroid  She was followed by Dr. Keeley Castaneda who retired but may see Dr. Doug Díaz if anything abnormal with her above labs. She is still following with Berta Vargas in [place]. She has a comprehensive set of labs which he requested. I ordered but let patient know that her insurance would have to cover these oral she may need to pay. She is aware.       Bloating  Resolving        Past Medical History:

## 2023-08-02 ENCOUNTER — PATIENT MESSAGE (OUTPATIENT)
Age: 43
End: 2023-08-02

## 2023-08-02 DIAGNOSIS — Z00.00 WELL ADULT EXAM: Primary | ICD-10-CM

## 2024-08-20 ENCOUNTER — OFFICE VISIT (OUTPATIENT)
Age: 44
End: 2024-08-20
Payer: COMMERCIAL

## 2024-08-20 VITALS
SYSTOLIC BLOOD PRESSURE: 121 MMHG | HEIGHT: 67 IN | HEART RATE: 78 BPM | BODY MASS INDEX: 22.66 KG/M2 | WEIGHT: 144.4 LBS | OXYGEN SATURATION: 98 % | DIASTOLIC BLOOD PRESSURE: 84 MMHG | RESPIRATION RATE: 14 BRPM

## 2024-08-20 DIAGNOSIS — Z00.00 WELL ADULT EXAM: Primary | ICD-10-CM

## 2024-08-20 PROCEDURE — 99396 PREV VISIT EST AGE 40-64: CPT | Performed by: INTERNAL MEDICINE

## 2024-08-20 ASSESSMENT — PATIENT HEALTH QUESTIONNAIRE - PHQ9
1. LITTLE INTEREST OR PLEASURE IN DOING THINGS: NOT AT ALL
SUM OF ALL RESPONSES TO PHQ QUESTIONS 1-9: 0
SUM OF ALL RESPONSES TO PHQ QUESTIONS 1-9: 0
SUM OF ALL RESPONSES TO PHQ9 QUESTIONS 1 & 2: 0
SUM OF ALL RESPONSES TO PHQ QUESTIONS 1-9: 0
SUM OF ALL RESPONSES TO PHQ QUESTIONS 1-9: 0
2. FEELING DOWN, DEPRESSED OR HOPELESS: NOT AT ALL

## 2024-08-20 NOTE — PROGRESS NOTES
ASSESSMENT & PLAN:  1. Well adult exam  -     CBC; Future  -     Comprehensive Metabolic Panel; Future  -     Lipid Panel; Future  -     Vitamin D 25 Hydroxy; Future  -     TSH; Future  -     T4, Free; Future  -     T3, Free; Future  -     T3, Reverse; Future  -     Ferritin; Future  -     Follicle Stimulating Hormone; Future  -     Estrogens, total; Future  -     Estradiol; Future  -     Progesterone; Future  -     Testosterone, free, total; Future     She is getting her mammograms through Dr. Rinaldi.    She is being followed by her specialists Dr. Avendano with dermatology, she is having skin cancer screening every 2 years.  She sees Dr. Rinaldi annually.  She is still following with her hematologist regarding thrombocytopenia.      She is followed by her naturopath, Davi Ta from [place].  She is requesting labs for her assessment and treatment from him.    Discussed with patient that can order labs requested however her insurance may not fully cover and she should check coverage.  She is aware.  Last year she was able to get her labs through a private lab company.                   Chief Complaint   Patient presents with    Complete Physical       Patient presents for her annual.  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  As noted she is still being followed by her naturopath and nutritionist.  Had labs done through private company last year and these were reviewed  March 2024: Total cholesterol 147, LDL 74, CBC within normal limits  Cbc wnl  Since her last visit she had COVID a few more times.  She had the delta variant which made her more ill but overall improved after 5 times of COVID      thrombocytopenia had thrombocytopenia following the birth of her last child.  Notes that her levels decrease and then normalized.      Thyroid  No longer seeing endocrinologist but is following with bashir Tomlinson in [place].  She has a comprehensive set of labs which he requested.  She feels

## 2024-10-26 LAB
25(OH)D3+25(OH)D2 SERPL-MCNC: 46.3 NG/ML (ref 30–100)
ALBUMIN SERPL-MCNC: 4.6 G/DL (ref 3.9–4.9)
ALP SERPL-CCNC: 75 IU/L (ref 44–121)
ALT SERPL-CCNC: 16 IU/L (ref 0–32)
AST SERPL-CCNC: 21 IU/L (ref 0–40)
BASOPHILS # BLD AUTO: 0 X10E3/UL (ref 0–0.2)
BASOPHILS NFR BLD AUTO: 1 %
BILIRUB SERPL-MCNC: 0.5 MG/DL (ref 0–1.2)
BUN SERPL-MCNC: 10 MG/DL (ref 6–24)
BUN/CREAT SERPL: 18 (ref 9–23)
CALCIUM SERPL-MCNC: 9.5 MG/DL (ref 8.7–10.2)
CHLORIDE SERPL-SCNC: 102 MMOL/L (ref 96–106)
CHOLEST SERPL-MCNC: 156 MG/DL (ref 100–199)
CO2 SERPL-SCNC: 25 MMOL/L (ref 20–29)
CREAT SERPL-MCNC: 0.55 MG/DL (ref 0.57–1)
EGFRCR SERPLBLD CKD-EPI 2021: 118 ML/MIN/1.73
EOSINOPHIL # BLD AUTO: 0.2 X10E3/UL (ref 0–0.4)
EOSINOPHIL NFR BLD AUTO: 5 %
ERYTHROCYTE [DISTWIDTH] IN BLOOD BY AUTOMATED COUNT: 13 % (ref 11.7–15.4)
ESTRADIOL SERPL-MCNC: 7.4 PG/ML
FERRITIN SERPL-MCNC: 46 NG/ML (ref 15–150)
FSH SERPL-ACNC: 97.4 MIU/ML
GLOBULIN SER CALC-MCNC: 2.3 G/DL (ref 1.5–4.5)
GLUCOSE SERPL-MCNC: 84 MG/DL (ref 70–99)
HCT VFR BLD AUTO: 41.8 % (ref 34–46.6)
HDLC SERPL-MCNC: 58 MG/DL
HGB BLD-MCNC: 13.4 G/DL (ref 11.1–15.9)
IMM GRANULOCYTES # BLD AUTO: 0 X10E3/UL (ref 0–0.1)
IMM GRANULOCYTES NFR BLD AUTO: 0 %
IMP & REVIEW OF LAB RESULTS: NORMAL
LDLC SERPL CALC-MCNC: 82 MG/DL (ref 0–99)
LYMPHOCYTES # BLD AUTO: 1.4 X10E3/UL (ref 0.7–3.1)
LYMPHOCYTES NFR BLD AUTO: 32 %
MCH RBC QN AUTO: 28.8 PG (ref 26.6–33)
MCHC RBC AUTO-ENTMCNC: 32.1 G/DL (ref 31.5–35.7)
MCV RBC AUTO: 90 FL (ref 79–97)
MONOCYTES # BLD AUTO: 0.3 X10E3/UL (ref 0.1–0.9)
MONOCYTES NFR BLD AUTO: 8 %
NEUTROPHILS # BLD AUTO: 2.5 X10E3/UL (ref 1.4–7)
NEUTROPHILS NFR BLD AUTO: 54 %
PLATELET # BLD AUTO: 146 X10E3/UL (ref 150–450)
POTASSIUM SERPL-SCNC: 4.3 MMOL/L (ref 3.5–5.2)
PROGEST SERPL-MCNC: 0.3 NG/ML
PROT SERPL-MCNC: 6.9 G/DL (ref 6–8.5)
RBC # BLD AUTO: 4.66 X10E6/UL (ref 3.77–5.28)
SODIUM SERPL-SCNC: 141 MMOL/L (ref 134–144)
T3FREE SERPL-MCNC: 2.9 PG/ML (ref 2–4.4)
T4 FREE SERPL-MCNC: 1.4 NG/DL (ref 0.82–1.77)
TRIGL SERPL-MCNC: 86 MG/DL (ref 0–149)
TSH SERPL DL<=0.005 MIU/L-ACNC: 0.42 UIU/ML (ref 0.45–4.5)
VLDLC SERPL CALC-MCNC: 16 MG/DL (ref 5–40)
WBC # BLD AUTO: 4.6 X10E3/UL (ref 3.4–10.8)

## 2024-10-28 LAB
IMP & REVIEW OF LAB RESULTS: NORMAL
T3REVERSE SERPL-MCNC: 16.6 NG/DL (ref 9.2–24.1)

## 2024-10-29 LAB
ESTROGEN SERPL-MCNC: 75 PG/ML
TESTOST FREE SERPL-MCNC: 1.5 PG/ML (ref 0–4.2)
TESTOST SERPL-MCNC: 41 NG/DL (ref 4–50)

## 2025-08-28 ENCOUNTER — OFFICE VISIT (OUTPATIENT)
Facility: CLINIC | Age: 45
End: 2025-08-28
Payer: COMMERCIAL

## 2025-08-28 VITALS
BODY MASS INDEX: 22.41 KG/M2 | HEART RATE: 89 BPM | HEIGHT: 67 IN | SYSTOLIC BLOOD PRESSURE: 118 MMHG | WEIGHT: 142.8 LBS | DIASTOLIC BLOOD PRESSURE: 80 MMHG | OXYGEN SATURATION: 97 % | RESPIRATION RATE: 14 BRPM

## 2025-08-28 DIAGNOSIS — Z00.00 WELL ADULT EXAM: Primary | ICD-10-CM

## 2025-08-28 PROCEDURE — 99396 PREV VISIT EST AGE 40-64: CPT | Performed by: INTERNAL MEDICINE

## 2025-08-28 SDOH — ECONOMIC STABILITY: FOOD INSECURITY: WITHIN THE PAST 12 MONTHS, YOU WORRIED THAT YOUR FOOD WOULD RUN OUT BEFORE YOU GOT MONEY TO BUY MORE.: NEVER TRUE

## 2025-08-28 SDOH — ECONOMIC STABILITY: FOOD INSECURITY: WITHIN THE PAST 12 MONTHS, THE FOOD YOU BOUGHT JUST DIDN'T LAST AND YOU DIDN'T HAVE MONEY TO GET MORE.: NEVER TRUE

## 2025-08-28 ASSESSMENT — PATIENT HEALTH QUESTIONNAIRE - PHQ9
SUM OF ALL RESPONSES TO PHQ QUESTIONS 1-9: 0
1. LITTLE INTEREST OR PLEASURE IN DOING THINGS: NOT AT ALL
SUM OF ALL RESPONSES TO PHQ QUESTIONS 1-9: 0
2. FEELING DOWN, DEPRESSED OR HOPELESS: NOT AT ALL
SUM OF ALL RESPONSES TO PHQ QUESTIONS 1-9: 0
SUM OF ALL RESPONSES TO PHQ QUESTIONS 1-9: 0